# Patient Record
Sex: MALE | Race: WHITE | Employment: OTHER | URBAN - METROPOLITAN AREA
[De-identification: names, ages, dates, MRNs, and addresses within clinical notes are randomized per-mention and may not be internally consistent; named-entity substitution may affect disease eponyms.]

---

## 2017-05-30 ENCOUNTER — HOSPITAL ENCOUNTER (INPATIENT)
Facility: HOSPITAL | Age: 82
LOS: 1 days | Discharge: HOME | DRG: 690 | End: 2017-05-31
Attending: EMERGENCY MEDICINE | Admitting: HOSPITALIST
Payer: MEDICARE

## 2017-05-30 ENCOUNTER — APPOINTMENT (OUTPATIENT)
Dept: CT IMAGING | Age: 82
DRG: 690 | End: 2017-05-30
Attending: EMERGENCY MEDICINE
Payer: MEDICARE

## 2017-05-30 ENCOUNTER — OFFICE VISIT (OUTPATIENT)
Dept: FAMILY MEDICINE CLINIC | Facility: CLINIC | Age: 82
End: 2017-05-30

## 2017-05-30 VITALS
OXYGEN SATURATION: 97 % | RESPIRATION RATE: 18 BRPM | DIASTOLIC BLOOD PRESSURE: 78 MMHG | TEMPERATURE: 99 F | SYSTOLIC BLOOD PRESSURE: 120 MMHG | HEART RATE: 82 BPM | BODY MASS INDEX: 29 KG/M2 | WEIGHT: 188 LBS

## 2017-05-30 DIAGNOSIS — R30.0 DYSURIA: Primary | ICD-10-CM

## 2017-05-30 DIAGNOSIS — Z02.9 ENCOUNTERS FOR ADMINISTRATIVE PURPOSES: ICD-10-CM

## 2017-05-30 DIAGNOSIS — N12 PYELONEPHRITIS: Primary | ICD-10-CM

## 2017-05-30 PROBLEM — D69.6 THROMBOCYTOPENIA (HCC): Status: ACTIVE | Noted: 2017-05-30

## 2017-05-30 PROBLEM — R73.9 HYPERGLYCEMIA: Status: ACTIVE | Noted: 2017-05-30

## 2017-05-30 PROBLEM — D69.6 THROMBOCYTOPENIA: Status: ACTIVE | Noted: 2017-05-30

## 2017-05-30 PROCEDURE — 81003 URINALYSIS AUTO W/O SCOPE: CPT | Performed by: PHYSICIAN ASSISTANT

## 2017-05-30 PROCEDURE — 99223 1ST HOSP IP/OBS HIGH 75: CPT | Performed by: HOSPITALIST

## 2017-05-30 PROCEDURE — 74176 CT ABD & PELVIS W/O CONTRAST: CPT | Performed by: EMERGENCY MEDICINE

## 2017-05-30 RX ORDER — ATORVASTATIN CALCIUM 10 MG/1
10 TABLET, FILM COATED ORAL NIGHTLY
Status: DISCONTINUED | OUTPATIENT
Start: 2017-05-30 | End: 2017-05-31

## 2017-05-30 RX ORDER — POTASSIUM CHLORIDE 20 MEQ/1
40 TABLET, EXTENDED RELEASE ORAL EVERY 4 HOURS
Status: COMPLETED | OUTPATIENT
Start: 2017-05-30 | End: 2017-05-30

## 2017-05-30 RX ORDER — DORZOLAMIDE HCL 20 MG/ML
1 SOLUTION/ DROPS OPHTHALMIC 2 TIMES DAILY
COMMUNITY
End: 2021-01-23

## 2017-05-30 RX ORDER — ACETAMINOPHEN 325 MG/1
650 TABLET ORAL EVERY 4 HOURS PRN
Status: DISCONTINUED | OUTPATIENT
Start: 2017-05-30 | End: 2017-05-31

## 2017-05-30 RX ORDER — CLOPIDOGREL BISULFATE 75 MG/1
75 TABLET ORAL DAILY
Status: DISCONTINUED | OUTPATIENT
Start: 2017-05-30 | End: 2017-05-31

## 2017-05-30 RX ORDER — SODIUM CHLORIDE 9 MG/ML
INJECTION, SOLUTION INTRAVENOUS CONTINUOUS
Status: DISCONTINUED | OUTPATIENT
Start: 2017-05-30 | End: 2017-05-31

## 2017-05-30 RX ORDER — PANTOPRAZOLE SODIUM 40 MG/1
40 TABLET, DELAYED RELEASE ORAL
Status: DISCONTINUED | OUTPATIENT
Start: 2017-05-31 | End: 2017-05-31

## 2017-05-30 RX ORDER — LEVOTHYROXINE SODIUM 0.03 MG/1
25 TABLET ORAL
Status: DISCONTINUED | OUTPATIENT
Start: 2017-05-30 | End: 2017-05-31

## 2017-05-30 RX ORDER — MORPHINE SULFATE 4 MG/ML
4 INJECTION, SOLUTION INTRAMUSCULAR; INTRAVENOUS EVERY 2 HOUR PRN
Status: DISCONTINUED | OUTPATIENT
Start: 2017-05-30 | End: 2017-05-31

## 2017-05-30 RX ORDER — CETIRIZINE HYDROCHLORIDE 10 MG/1
10 TABLET ORAL DAILY
Status: DISCONTINUED | OUTPATIENT
Start: 2017-05-30 | End: 2017-05-31

## 2017-05-30 RX ORDER — MORPHINE SULFATE 2 MG/ML
1 INJECTION, SOLUTION INTRAMUSCULAR; INTRAVENOUS EVERY 2 HOUR PRN
Status: DISCONTINUED | OUTPATIENT
Start: 2017-05-30 | End: 2017-05-31

## 2017-05-30 RX ORDER — MORPHINE SULFATE 2 MG/ML
2 INJECTION, SOLUTION INTRAMUSCULAR; INTRAVENOUS EVERY 2 HOUR PRN
Status: DISCONTINUED | OUTPATIENT
Start: 2017-05-30 | End: 2017-05-31

## 2017-05-30 RX ORDER — LATANOPROST 50 UG/ML
1 SOLUTION/ DROPS OPHTHALMIC NIGHTLY
Status: DISCONTINUED | OUTPATIENT
Start: 2017-05-30 | End: 2017-05-31

## 2017-05-30 RX ORDER — LORAZEPAM 0.5 MG/1
0.5 TABLET ORAL EVERY 4 HOURS PRN
Status: DISCONTINUED | OUTPATIENT
Start: 2017-05-30 | End: 2017-05-31

## 2017-05-30 RX ORDER — FINASTERIDE 5 MG/1
5 TABLET, FILM COATED ORAL DAILY
Status: DISCONTINUED | OUTPATIENT
Start: 2017-05-30 | End: 2017-05-31

## 2017-05-30 RX ORDER — ALFUZOSIN HYDROCHLORIDE 10 MG/1
10 TABLET, EXTENDED RELEASE ORAL
Status: DISCONTINUED | OUTPATIENT
Start: 2017-05-31 | End: 2017-05-31

## 2017-05-30 RX ORDER — ZOLPIDEM TARTRATE 5 MG/1
5 TABLET ORAL NIGHTLY PRN
Status: DISCONTINUED | OUTPATIENT
Start: 2017-05-30 | End: 2017-05-31

## 2017-05-30 RX ORDER — TIMOLOL MALEATE 5 MG/ML
1 SOLUTION/ DROPS OPHTHALMIC DAILY
Status: DISCONTINUED | OUTPATIENT
Start: 2017-05-30 | End: 2017-05-31

## 2017-05-30 RX ORDER — ONDANSETRON 2 MG/ML
4 INJECTION INTRAMUSCULAR; INTRAVENOUS EVERY 6 HOURS PRN
Status: DISCONTINUED | OUTPATIENT
Start: 2017-05-30 | End: 2017-05-31

## 2017-05-30 RX ORDER — METOPROLOL SUCCINATE 25 MG/1
25 TABLET, EXTENDED RELEASE ORAL
Status: DISCONTINUED | OUTPATIENT
Start: 2017-05-31 | End: 2017-05-31

## 2017-05-30 RX ORDER — RABEPRAZOLE SODIUM 20 MG/1
20 TABLET, DELAYED RELEASE ORAL DAILY
COMMUNITY
End: 2021-10-05

## 2017-05-30 RX ORDER — ENOXAPARIN SODIUM 100 MG/ML
40 INJECTION SUBCUTANEOUS EVERY 24 HOURS
Status: DISCONTINUED | OUTPATIENT
Start: 2017-05-30 | End: 2017-05-31

## 2017-05-30 RX ORDER — ESCITALOPRAM OXALATE 20 MG/1
20 TABLET ORAL DAILY
Status: DISCONTINUED | OUTPATIENT
Start: 2017-05-30 | End: 2017-05-31

## 2017-05-30 RX ORDER — EZETIMIBE 10 MG/1
10 TABLET ORAL NIGHTLY
Status: DISCONTINUED | OUTPATIENT
Start: 2017-05-30 | End: 2017-05-31

## 2017-05-30 RX ORDER — HYDROCODONE BITARTRATE AND ACETAMINOPHEN 5; 325 MG/1; MG/1
2 TABLET ORAL EVERY 4 HOURS PRN
Status: DISCONTINUED | OUTPATIENT
Start: 2017-05-30 | End: 2017-05-31

## 2017-05-30 RX ORDER — HYDROCODONE BITARTRATE AND ACETAMINOPHEN 5; 325 MG/1; MG/1
1 TABLET ORAL EVERY 4 HOURS PRN
Status: DISCONTINUED | OUTPATIENT
Start: 2017-05-30 | End: 2017-05-31

## 2017-05-30 RX ORDER — ASPIRIN 81 MG/1
81 TABLET, CHEWABLE ORAL DAILY
Status: DISCONTINUED | OUTPATIENT
Start: 2017-05-30 | End: 2017-05-31

## 2017-05-30 RX ORDER — AMLODIPINE BESYLATE 5 MG/1
5 TABLET ORAL DAILY
Status: DISCONTINUED | OUTPATIENT
Start: 2017-05-30 | End: 2017-05-31

## 2017-05-30 RX ORDER — SODIUM CHLORIDE 9 MG/ML
INJECTION, SOLUTION INTRAVENOUS CONTINUOUS
Status: ACTIVE | OUTPATIENT
Start: 2017-05-30 | End: 2017-05-30

## 2017-05-30 RX ORDER — ONDANSETRON 2 MG/ML
4 INJECTION INTRAMUSCULAR; INTRAVENOUS EVERY 4 HOURS PRN
Status: DISCONTINUED | OUTPATIENT
Start: 2017-05-30 | End: 2017-05-30

## 2017-05-30 NOTE — PROGRESS NOTES
CHIEF COMPLAINT:   Patient presents with: Body ache and/or chills: Loss of appetite, Since Sunday. HPI:   Fidelia Salas is a [de-identified]year old male who presents for \"not feeling well since Sunday. \"  Pt reports he is unable to sleep, subjective fevers

## 2017-05-30 NOTE — H&P
[facility] HOSPITALIST  History and Physical     [facility] Patient Status:  Emergency    1935 MRN [id number]   Location [facility] Attending Vicki Wells MD   Hosp Day # 0 PCP Luli Nick     Chief Complaint: CHOLECYSTECTOMY N/A 3/25/2015    Comment Procedure: LAPAROSCOPIC CHOLECYSTECTOMY;  Surgeon: Milly Carvalho DO;  Location: [facility] MAIN OR       Social History:  reports that he quit smoking about 46 years ago. His smoking use included Cigarettes.  He smoked 0.5 drop into both eyes nightly. Disp:  Rfl:    Ergocalciferol (VITAMIN D OR) Take 5,000 mg by mouth daily. Disp:  Rfl:    Dexlansoprazole (DEXILANT) 60 MG Oral Capsule Delayed Release Take 60 mg by mouth daily.  Disp: 90 capsule Rfl: 0   aspirin 81 MG Oral T 36.8 mL/min (based on Cr of 1.55). No results for input(s): PTP, INR in the last 72 hours. No results for input(s): TROP, CK in the last 72 hours. Imaging: Imaging data reviewed in Epic. ASSESSMENT / PLAN:     1. Acute pyelonephritis  1.  IVF,

## 2017-05-30 NOTE — ED PROVIDER NOTES
Patient Seen in: THE [facility] Emergency Department In [place]    History   Patient presents with:  Fever (infectious)  Urinary Symptoms (urologic)    Stated Complaint: SINCE SUNDAY WITH CHILLS, FEVER, INABLILITY TO EAT.   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   STATES WAS ISABEL WBC Urine 21-50 (*)     Bacteria Urine 1+ (*)     Granular Casts Present (*)     Mucous Urine 1+ (*)     All other components within normal limits   CBC W/ DIFFERENTIAL - Abnormal; Notable for the following:     .0 (*)     MCH 33.7 (*)     RDW-SD 49 BASES:  Stable. Mild scarring and/or atelectasis. OTHER:  None. patient was given IV fluids and Rocephin  MDM     [de-identified]year-old with urinary tract infection symptoms, weakness and fever at home. CT without obstructing ureteral stone.   Creatinine minimal

## 2017-05-31 VITALS
OXYGEN SATURATION: 100 % | WEIGHT: 185 LBS | RESPIRATION RATE: 18 BRPM | DIASTOLIC BLOOD PRESSURE: 70 MMHG | TEMPERATURE: 98 F | SYSTOLIC BLOOD PRESSURE: 142 MMHG | HEART RATE: 64 BPM | HEIGHT: 68 IN | BODY MASS INDEX: 28.04 KG/M2

## 2017-05-31 PROCEDURE — 99239 HOSP IP/OBS DSCHRG MGMT >30: CPT | Performed by: HOSPITALIST

## 2017-05-31 RX ORDER — CEFADROXIL 500 MG/1
500 CAPSULE ORAL 2 TIMES DAILY
Qty: 14 CAPSULE | Refills: 0 | Status: SHIPPED | OUTPATIENT
Start: 2017-05-31 | End: 2017-06-07

## 2017-05-31 NOTE — PLAN OF CARE
DISCHARGE PLANNING    • Discharge to home or other facility with appropriate resources Adequate for Discharge        GENITOURINARY - ADULT    • Absence of urinary retention Adequate for Discharge        PAIN - ADULT    • Verbalizes/displays adequate comfor

## 2017-05-31 NOTE — PROGRESS NOTES
Patient is requesting a sleeping pill. Patient reports he normally takes 5-10mg of ambien at home. Dr. Ivan Aguayo paged-waiting for response. 2045: New orders received. Will implement.

## 2017-05-31 NOTE — CM/SW NOTE
05/31/17 1100   CM/SW Referral Data   Referral Source Physician;Social Work (self-referral)   Reason for Referral Discharge planning   Informant Patient   Pertinent Medical Hx   Primary Care Physician Name Seymoure   Patient Info   Patient's Mental Stat

## 2017-06-02 NOTE — DISCHARGE SUMMARY
[facility] HOSPITALIST  DISCHARGE SUMMARY     Germain Ward Patient Status:  Inpatient    1935 MRN [id number]   [facility] 3NW-A Attending No att. providers found   Hosp Day # 1 PCP Nava Dooley     Date of Admission: 2017  Date of renal function returned to his baseline. He was eventually stable for discharge on oral antibiotics. His blood cultures remained negative.         Discharge Medication List:     Discharge Medications      START taking these medications       Instructions 25 mcg on 5/31/2017  6:19 AM   Commonly known as:  SYNTHROID, LEVOTHROID        Take 25 mcg by mouth before breakfast.    Refills:  0       LORazepam 0.5 MG Tabs   Commonly known as:  ATIVAN        Take 0.5 mg by mouth nightly.     Refills:  0       Metopro Karo Espinoza MD 6/2/2017    Time spent:  > 30 minutes

## 2017-09-04 ENCOUNTER — HOSPITAL ENCOUNTER (EMERGENCY)
Age: 82
Discharge: HOME | End: 2017-09-04
Attending: EMERGENCY MEDICINE
Payer: MEDICARE

## 2017-09-04 VITALS
BODY MASS INDEX: 29.82 KG/M2 | TEMPERATURE: 98 F | HEIGHT: 67 IN | RESPIRATION RATE: 18 BRPM | WEIGHT: 190 LBS | DIASTOLIC BLOOD PRESSURE: 81 MMHG | HEART RATE: 68 BPM | OXYGEN SATURATION: 100 % | SYSTOLIC BLOOD PRESSURE: 133 MMHG

## 2017-09-04 DIAGNOSIS — B02.8 HERPES ZOSTER WITH COMPLICATION: Primary | ICD-10-CM

## 2017-09-04 PROCEDURE — 99283 EMERGENCY DEPT VISIT LOW MDM: CPT

## 2017-09-04 RX ORDER — ACYCLOVIR 800 MG/1
800 TABLET ORAL 3 TIMES DAILY
Qty: 21 TABLET | Refills: 0 | Status: SHIPPED | OUTPATIENT
Start: 2017-09-04 | End: 2021-01-23

## 2017-09-04 RX ORDER — PREDNISONE 20 MG/1
40 TABLET ORAL DAILY
Qty: 10 TABLET | Refills: 0 | Status: SHIPPED | OUTPATIENT
Start: 2017-09-04 | End: 2017-09-09

## 2017-09-04 NOTE — ED PROVIDER NOTES
Patient Seen in: [facility] In [place]    History   Patient presents with:  Rash Skin Problem (integumentary)    Stated Complaint: rash to perineal area    HPI    CHIEF COMPLAINT: Rash to scrotum and left buttock    HISTORY OF PRESENT • History of kidney cancer    • Hypothyroid    • Other and unspecified hyperlipidemia    • Unspecified disorder of thyroid    • Unspecified essential hypertension    • Unspecified sleep apnea     no CPAP   • Vitamin B 12 deficiency        Past Surgical His Levothyroxine Sodium (SYNTHROID, LEVOTHROID) 25 MCG Oral Tab,  Take 25 mcg by mouth before breakfast.   TRAVATAN Z 0.004 % Ophthalmic Solution,  Place 1 drop into both eyes nightly. Ergocalciferol (VITAMIN D OR),  Take 5,000 mg by mouth daily.      Alex Zhou Cardiovascular: Normal rate, regular rhythm, normal heart sounds and intact distal pulses. Pulmonary/Chest: Effort normal and breath sounds normal.   Abdominal: Soft.  Bowel sounds are normal. Hernia confirmed negative in the right inguinal area and conf Medications Prescribed:  Discharge Medication List as of 9/4/2017  1:47 PM    START taking these medications    acyclovir 800 MG Oral Tab  Take 1 tablet (800 mg total) by mouth 3 (three) times daily. , Normal, Disp-21 tablet, R-0    predniSONE 20 MG Oral

## 2017-09-04 NOTE — ED PROVIDER NOTES
I reviewed that chart and discussed the case. I have examined the patient and noted patient has what seems to be a shingles going from his left buttocks, left scrotal area, left perineal area to the left side of his penis area. There is no discharge.   He

## 2018-06-08 ENCOUNTER — APPOINTMENT (OUTPATIENT)
Dept: GENERAL RADIOLOGY | Age: 83
End: 2018-06-08
Attending: EMERGENCY MEDICINE
Payer: MEDICARE

## 2018-06-08 ENCOUNTER — HOSPITAL ENCOUNTER (EMERGENCY)
Age: 83
Discharge: HOME | End: 2018-06-08
Attending: EMERGENCY MEDICINE
Payer: MEDICARE

## 2018-06-08 VITALS
DIASTOLIC BLOOD PRESSURE: 74 MMHG | HEART RATE: 57 BPM | SYSTOLIC BLOOD PRESSURE: 115 MMHG | HEIGHT: 68 IN | WEIGHT: 180 LBS | BODY MASS INDEX: 27.28 KG/M2 | TEMPERATURE: 98 F | RESPIRATION RATE: 16 BRPM | OXYGEN SATURATION: 98 %

## 2018-06-08 DIAGNOSIS — R07.89 CHEST PAIN, ATYPICAL: Primary | ICD-10-CM

## 2018-06-08 PROCEDURE — 36415 COLL VENOUS BLD VENIPUNCTURE: CPT

## 2018-06-08 PROCEDURE — 99285 EMERGENCY DEPT VISIT HI MDM: CPT

## 2018-06-08 PROCEDURE — 84484 ASSAY OF TROPONIN QUANT: CPT | Performed by: EMERGENCY MEDICINE

## 2018-06-08 PROCEDURE — 85025 COMPLETE CBC W/AUTO DIFF WBC: CPT | Performed by: EMERGENCY MEDICINE

## 2018-06-08 PROCEDURE — 80053 COMPREHEN METABOLIC PANEL: CPT | Performed by: EMERGENCY MEDICINE

## 2018-06-08 PROCEDURE — 71045 X-RAY EXAM CHEST 1 VIEW: CPT | Performed by: EMERGENCY MEDICINE

## 2018-06-08 PROCEDURE — 93010 ELECTROCARDIOGRAM REPORT: CPT

## 2018-06-08 PROCEDURE — 93005 ELECTROCARDIOGRAM TRACING: CPT

## 2018-06-09 NOTE — ED INITIAL ASSESSMENT (HPI)
PT STS CHEST PAIN X 1 WEEK. PT STS EPISODES LASTING \"A FEW MINUTES\" EVERY EVENING SINCE ONSET. PT DENIES CP AT PRESENT.

## 2018-06-09 NOTE — ED PROVIDER NOTES
Patient Seen in: [facility] In [place]    History   Patient presents with:  Chest Pain Angina (cardiovascular)    Stated Complaint: CHEST PAIN     HPI    Patient is an 42-year-old male with a history of hypertension, high cholesterol, VASECTOMY        Smoking status: Former Smoker                                                              Packs/day: 0.50      Years: 0.00         Types: Cigarettes     Quit date: 12/20/1970  Smokeless tobacco: Never Used                      Comment: qu I - Normal   CBC WITH DIFFERENTIAL WITH PLATELET    Narrative: The following orders were created for panel order CBC WITH DIFFERENTIAL WITH PLATELET.   Procedure                               Abnormality         Status                     --------- troponin at the 2 hour irene and discuss again. Updated 11:10 PM.  Repeat troponin negative. Patient remains pain-free, no episodes here. He is very eager to go home. I did again offer admission for serial troponins but he declines.   I discussed with

## 2018-11-05 ENCOUNTER — APPOINTMENT (OUTPATIENT)
Dept: GENERAL RADIOLOGY | Age: 83
End: 2018-11-05
Attending: EMERGENCY MEDICINE
Payer: MEDICARE

## 2018-11-05 ENCOUNTER — HOSPITAL ENCOUNTER (EMERGENCY)
Age: 83
Discharge: HOME | End: 2018-11-05
Attending: EMERGENCY MEDICINE
Payer: MEDICARE

## 2018-11-05 ENCOUNTER — OFFICE VISIT (OUTPATIENT)
Dept: FAMILY MEDICINE CLINIC | Facility: CLINIC | Age: 83
End: 2018-11-05
Payer: MEDICARE

## 2018-11-05 VITALS
SYSTOLIC BLOOD PRESSURE: 98 MMHG | HEIGHT: 68 IN | WEIGHT: 172 LBS | BODY MASS INDEX: 26.07 KG/M2 | OXYGEN SATURATION: 98 % | RESPIRATION RATE: 20 BRPM | TEMPERATURE: 98 F | DIASTOLIC BLOOD PRESSURE: 54 MMHG | HEART RATE: 80 BPM

## 2018-11-05 VITALS
RESPIRATION RATE: 16 BRPM | OXYGEN SATURATION: 98 % | BODY MASS INDEX: 26.07 KG/M2 | SYSTOLIC BLOOD PRESSURE: 147 MMHG | HEIGHT: 68 IN | HEART RATE: 67 BPM | WEIGHT: 172 LBS | DIASTOLIC BLOOD PRESSURE: 79 MMHG | TEMPERATURE: 98 F

## 2018-11-05 DIAGNOSIS — N39.0 URINARY TRACT INFECTION WITHOUT HEMATURIA, SITE UNSPECIFIED: Primary | ICD-10-CM

## 2018-11-05 DIAGNOSIS — Z02.9 ENCOUNTER FOR ADMINISTRATIVE EXAMINATIONS: Primary | ICD-10-CM

## 2018-11-05 PROCEDURE — 80053 COMPREHEN METABOLIC PANEL: CPT | Performed by: EMERGENCY MEDICINE

## 2018-11-05 PROCEDURE — 96365 THER/PROPH/DIAG IV INF INIT: CPT

## 2018-11-05 PROCEDURE — 81001 URINALYSIS AUTO W/SCOPE: CPT | Performed by: EMERGENCY MEDICINE

## 2018-11-05 PROCEDURE — 87088 URINE BACTERIA CULTURE: CPT | Performed by: EMERGENCY MEDICINE

## 2018-11-05 PROCEDURE — 74018 RADEX ABDOMEN 1 VIEW: CPT | Performed by: EMERGENCY MEDICINE

## 2018-11-05 PROCEDURE — 87086 URINE CULTURE/COLONY COUNT: CPT | Performed by: EMERGENCY MEDICINE

## 2018-11-05 PROCEDURE — 85025 COMPLETE CBC W/AUTO DIFF WBC: CPT

## 2018-11-05 PROCEDURE — 93005 ELECTROCARDIOGRAM TRACING: CPT

## 2018-11-05 PROCEDURE — 96361 HYDRATE IV INFUSION ADD-ON: CPT

## 2018-11-05 PROCEDURE — 85025 COMPLETE CBC W/AUTO DIFF WBC: CPT | Performed by: EMERGENCY MEDICINE

## 2018-11-05 PROCEDURE — 83605 ASSAY OF LACTIC ACID: CPT | Performed by: EMERGENCY MEDICINE

## 2018-11-05 PROCEDURE — 81015 MICROSCOPIC EXAM OF URINE: CPT | Performed by: EMERGENCY MEDICINE

## 2018-11-05 PROCEDURE — 87186 SC STD MICRODIL/AGAR DIL: CPT | Performed by: EMERGENCY MEDICINE

## 2018-11-05 PROCEDURE — 93010 ELECTROCARDIOGRAM REPORT: CPT

## 2018-11-05 PROCEDURE — 99285 EMERGENCY DEPT VISIT HI MDM: CPT

## 2018-11-05 PROCEDURE — 80053 COMPREHEN METABOLIC PANEL: CPT

## 2018-11-05 RX ORDER — SODIUM CHLORIDE 9 MG/ML
INJECTION, SOLUTION INTRAVENOUS ONCE
Status: COMPLETED | OUTPATIENT
Start: 2018-11-05 | End: 2018-11-05

## 2018-11-05 RX ORDER — SULFAMETHOXAZOLE AND TRIMETHOPRIM 800; 160 MG/1; MG/1
1 TABLET ORAL 2 TIMES DAILY
Qty: 20 TABLET | Refills: 0 | Status: SHIPPED | OUTPATIENT
Start: 2018-11-05 | End: 2018-11-15

## 2018-11-05 NOTE — ED PROVIDER NOTES
Patient Seen in: THE [facility] Emergency Department In [place]    History   Patient presents with:  Hypotension (cardiovascular)    Stated Complaint: low blood pressure    HPI    42-year-old male with history of coronary artery disease and one kidney status Pack years: 0        Types: Cigarettes        Quit date: 1970        Years since quittin.9      Smokeless tobacco: Never Used      Tobacco comment: quit 50 yrs ago    Alcohol use: No    Drug use: No      Review of Systems    Positive for stated (*)     Bacteria Urine 1+ (*)     All other components within normal limits   CBC W/ DIFFERENTIAL - Abnormal; Notable for the following components:    RDW-SD 48.2 (*)     Neutrophil Absolute Prelim 7.29 (*)     Neutrophil Absolute 7.29 (*)     All other co pm    Follow-up:  Garth Bar  [address] [phone]    Call in 2 days          Medications Prescribed:  Current Discharge Medication List    START taking these medications    Sulfamethoxazole-TMP -160 MG Oral Tab p

## 2018-11-05 NOTE — PROGRESS NOTES
Pt presented with   Weakness weakness, unable to eat, X 1-3 days      Constipation X 1-3 days     Urinary Frequency X 1-3 days    History 1 kidney. Only drank a cup of water today. Very poor appetite.  Having urinary frequency, doesn't usually have urinary

## 2018-11-19 ENCOUNTER — LAB ENCOUNTER (OUTPATIENT)
Dept: LAB | Age: 83
End: 2018-11-19
Attending: FAMILY MEDICINE
Payer: MEDICARE

## 2018-11-19 DIAGNOSIS — N39.0 URINARY TRACT INFECTION, SITE NOT SPECIFIED: Primary | ICD-10-CM

## 2018-11-19 PROCEDURE — 87086 URINE CULTURE/COLONY COUNT: CPT

## 2021-01-23 ENCOUNTER — APPOINTMENT (OUTPATIENT)
Dept: GENERAL RADIOLOGY | Age: 86
End: 2021-01-23
Attending: PHYSICIAN ASSISTANT
Payer: MEDICARE

## 2021-01-23 ENCOUNTER — HOSPITAL ENCOUNTER (OUTPATIENT)
Facility: HOSPITAL | Age: 86
Setting detail: OBSERVATION
Discharge: HOME | End: 2021-01-24
Attending: EMERGENCY MEDICINE | Admitting: HOSPITALIST
Payer: MEDICARE

## 2021-01-23 DIAGNOSIS — R07.89 CHEST PAIN, ATYPICAL: Primary | ICD-10-CM

## 2021-01-23 DIAGNOSIS — R05.9 COUGH: ICD-10-CM

## 2021-01-23 LAB
ALBUMIN SERPL-MCNC: 3.3 G/DL (ref 3.4–5)
ALBUMIN/GLOB SERPL: 1.1 {RATIO} (ref 1–2)
ALP LIVER SERPL-CCNC: 43 U/L
ALT SERPL-CCNC: 18 U/L
ANION GAP SERPL CALC-SCNC: 5 MMOL/L (ref 0–18)
APTT PPP: 29.4 SECONDS (ref 25.4–36.1)
AST SERPL-CCNC: 15 U/L (ref 15–37)
ATRIAL RATE: 53 BPM
BASOPHILS # BLD AUTO: 0.03 X10(3) UL (ref 0–0.2)
BASOPHILS NFR BLD AUTO: 0.4 %
BILIRUB SERPL-MCNC: 0.7 MG/DL (ref 0.1–2)
BUN BLD-MCNC: 15 MG/DL (ref 7–18)
BUN/CREAT SERPL: 10.9 (ref 10–20)
CALCIUM BLD-MCNC: 8.6 MG/DL (ref 8.5–10.1)
CHLORIDE SERPL-SCNC: 107 MMOL/L (ref 98–112)
CO2 SERPL-SCNC: 28 MMOL/L (ref 21–32)
CREAT BLD-MCNC: 1.37 MG/DL
D-DIMER: 0.65 UG/ML FEU (ref ?–0.83)
DEPRECATED RDW RBC AUTO: 47.8 FL (ref 35.1–46.3)
EOSINOPHIL # BLD AUTO: 0.13 X10(3) UL (ref 0–0.7)
EOSINOPHIL NFR BLD AUTO: 1.9 %
ERYTHROCYTE [DISTWIDTH] IN BLOOD BY AUTOMATED COUNT: 13 % (ref 11–15)
GLOBULIN PLAS-MCNC: 3 G/DL (ref 2.8–4.4)
GLUCOSE BLD-MCNC: 91 MG/DL (ref 70–99)
HCT VFR BLD AUTO: 38.5 %
HGB BLD-MCNC: 12.7 G/DL
IMM GRANULOCYTES # BLD AUTO: 0.01 X10(3) UL (ref 0–1)
IMM GRANULOCYTES NFR BLD: 0.1 %
INR BLD: 0.92 (ref 0.88–1.11)
LYMPHOCYTES # BLD AUTO: 1.34 X10(3) UL (ref 1–4)
LYMPHOCYTES NFR BLD AUTO: 20.1 %
M PROTEIN MFR SERPL ELPH: 6.3 G/DL (ref 6.4–8.2)
MCH RBC QN AUTO: 32.9 PG (ref 26–34)
MCHC RBC AUTO-ENTMCNC: 33 G/DL (ref 31–37)
MCV RBC AUTO: 99.7 FL
MONOCYTES # BLD AUTO: 0.73 X10(3) UL (ref 0.1–1)
MONOCYTES NFR BLD AUTO: 10.9 %
NEUTROPHILS # BLD AUTO: 4.43 X10 (3) UL (ref 1.5–7.7)
NEUTROPHILS # BLD AUTO: 4.43 X10(3) UL (ref 1.5–7.7)
NEUTROPHILS NFR BLD AUTO: 66.6 %
OSMOLALITY SERPL CALC.SUM OF ELEC: 290 MOSM/KG (ref 275–295)
P AXIS: 16 DEGREES
P-R INTERVAL: 144 MS
PLATELET # BLD AUTO: 154 10(3)UL (ref 150–450)
POTASSIUM SERPL-SCNC: 4 MMOL/L (ref 3.5–5.1)
PSA SERPL DL<=0.01 NG/ML-MCNC: 12.2 SECONDS (ref 12–14.3)
Q-T INTERVAL: 454 MS
QRS DURATION: 94 MS
QTC CALCULATION (BEZET): 426 MS
R AXIS: -43 DEGREES
RBC # BLD AUTO: 3.86 X10(6)UL
SARS-COV-2 RNA RESP QL NAA+PROBE: NOT DETECTED
SODIUM SERPL-SCNC: 140 MMOL/L (ref 136–145)
T AXIS: 12 DEGREES
TROPONIN I SERPL-MCNC: <0.045 NG/ML (ref ?–0.04)
VENTRICULAR RATE: 53 BPM
WBC # BLD AUTO: 6.7 X10(3) UL (ref 4–11)

## 2021-01-23 PROCEDURE — 99220 INITIAL OBSERVATION CARE,LEVL III: CPT | Performed by: HOSPITALIST

## 2021-01-23 PROCEDURE — 71045 X-RAY EXAM CHEST 1 VIEW: CPT | Performed by: PHYSICIAN ASSISTANT

## 2021-01-23 RX ORDER — GABAPENTIN 100 MG/1
100 CAPSULE ORAL 2 TIMES DAILY
COMMUNITY

## 2021-01-23 RX ORDER — EZETIMIBE 10 MG/1
10 TABLET ORAL NIGHTLY
Status: DISCONTINUED | OUTPATIENT
Start: 2021-01-23 | End: 2021-01-24

## 2021-01-23 RX ORDER — FLUTICASONE PROPIONATE 50 MCG
2 SPRAY, SUSPENSION (ML) NASAL DAILY
COMMUNITY

## 2021-01-23 RX ORDER — ROSUVASTATIN CALCIUM 20 MG/1
20 TABLET, COATED ORAL NIGHTLY
Status: DISCONTINUED | OUTPATIENT
Start: 2021-01-23 | End: 2021-01-24

## 2021-01-23 RX ORDER — HYDROCODONE BITARTRATE AND ACETAMINOPHEN 5; 300 MG/1; MG/1
1 TABLET ORAL EVERY 6 HOURS PRN
COMMUNITY

## 2021-01-23 RX ORDER — FLUTICASONE PROPIONATE 50 MCG
2 SPRAY, SUSPENSION (ML) NASAL DAILY
Status: DISCONTINUED | OUTPATIENT
Start: 2021-01-23 | End: 2021-01-24

## 2021-01-23 RX ORDER — ONDANSETRON 2 MG/ML
4 INJECTION INTRAMUSCULAR; INTRAVENOUS EVERY 6 HOURS PRN
Status: DISCONTINUED | OUTPATIENT
Start: 2021-01-23 | End: 2021-01-24

## 2021-01-23 RX ORDER — LEVOTHYROXINE SODIUM 0.03 MG/1
25 TABLET ORAL
Status: DISCONTINUED | OUTPATIENT
Start: 2021-01-23 | End: 2021-01-24

## 2021-01-23 RX ORDER — GABAPENTIN 100 MG/1
100 CAPSULE ORAL 2 TIMES DAILY
Status: DISCONTINUED | OUTPATIENT
Start: 2021-01-23 | End: 2021-01-24

## 2021-01-23 RX ORDER — DOCUSATE SODIUM 100 MG/1
100 CAPSULE, LIQUID FILLED ORAL 2 TIMES DAILY
COMMUNITY

## 2021-01-23 RX ORDER — ABIRATERONE ACETATE 250 MG/1
1000 TABLET ORAL DAILY
COMMUNITY

## 2021-01-23 RX ORDER — SODIUM CHLORIDE 9 MG/ML
INJECTION, SOLUTION INTRAVENOUS CONTINUOUS
Status: DISCONTINUED | OUTPATIENT
Start: 2021-01-23 | End: 2021-01-23

## 2021-01-23 RX ORDER — BRINZOLAMIDE 10 MG/ML
1 SUSPENSION/ DROPS OPHTHALMIC 2 TIMES DAILY
COMMUNITY

## 2021-01-23 RX ORDER — POLYETHYLENE GLYCOL 3350 17 G/17G
17 POWDER, FOR SOLUTION ORAL DAILY PRN
COMMUNITY

## 2021-01-23 RX ORDER — ASPIRIN 81 MG/1
81 TABLET, CHEWABLE ORAL EVERY OTHER DAY
Status: DISCONTINUED | OUTPATIENT
Start: 2021-01-24 | End: 2021-01-24

## 2021-01-23 RX ORDER — ROSUVASTATIN CALCIUM 20 MG/1
20 TABLET, COATED ORAL NIGHTLY
COMMUNITY

## 2021-01-23 RX ORDER — DOCUSATE SODIUM 100 MG/1
100 CAPSULE, LIQUID FILLED ORAL 2 TIMES DAILY
Status: DISCONTINUED | OUTPATIENT
Start: 2021-01-23 | End: 2021-01-24

## 2021-01-23 RX ORDER — SIMETHICONE 80 MG
80 TABLET,CHEWABLE ORAL
Status: DISCONTINUED | OUTPATIENT
Start: 2021-01-23 | End: 2021-01-24

## 2021-01-23 RX ORDER — AMINO ACIDS/MV,IRON,MIN
1 TABLET ORAL DAILY
COMMUNITY

## 2021-01-23 RX ORDER — ESCITALOPRAM OXALATE 20 MG/1
20 TABLET ORAL DAILY
Status: DISCONTINUED | OUTPATIENT
Start: 2021-01-23 | End: 2021-01-24

## 2021-01-23 RX ORDER — DORZOLAMIDE HCL 20 MG/ML
1 SOLUTION/ DROPS OPHTHALMIC 3 TIMES DAILY
Status: DISCONTINUED | OUTPATIENT
Start: 2021-01-23 | End: 2021-01-24

## 2021-01-23 RX ORDER — CLOPIDOGREL BISULFATE 75 MG/1
75 TABLET ORAL DAILY
Status: DISCONTINUED | OUTPATIENT
Start: 2021-01-23 | End: 2021-01-24

## 2021-01-23 RX ORDER — ACETAMINOPHEN 325 MG/1
650 TABLET ORAL EVERY 6 HOURS PRN
Status: DISCONTINUED | OUTPATIENT
Start: 2021-01-23 | End: 2021-01-24

## 2021-01-23 RX ORDER — BENZONATATE 100 MG/1
100 CAPSULE ORAL EVERY 8 HOURS PRN
Status: DISCONTINUED | OUTPATIENT
Start: 2021-01-23 | End: 2021-01-24

## 2021-01-23 RX ORDER — POLYETHYLENE GLYCOL 3350 17 G/17G
17 POWDER, FOR SOLUTION ORAL DAILY PRN
Status: DISCONTINUED | OUTPATIENT
Start: 2021-01-23 | End: 2021-01-24

## 2021-01-23 RX ORDER — TRAZODONE HYDROCHLORIDE 50 MG/1
50 TABLET ORAL NIGHTLY
Status: DISCONTINUED | OUTPATIENT
Start: 2021-01-23 | End: 2021-01-24

## 2021-01-23 RX ORDER — NETARSUDIL AND LATANOPROST OPHTHALMIC SOLUTION, 0.02%/0.005% .2; .05 MG/ML; MG/ML
1 SOLUTION/ DROPS OPHTHALMIC; TOPICAL NIGHTLY
COMMUNITY

## 2021-01-23 RX ORDER — ASPIRIN 81 MG/1
81 TABLET, CHEWABLE ORAL EVERY OTHER DAY
Status: DISCONTINUED | OUTPATIENT
Start: 2021-01-23 | End: 2021-01-23

## 2021-01-23 RX ORDER — METOPROLOL SUCCINATE 25 MG/1
25 TABLET, EXTENDED RELEASE ORAL
Status: DISCONTINUED | OUTPATIENT
Start: 2021-01-24 | End: 2021-01-24

## 2021-01-23 RX ORDER — PREDNISONE 1 MG/1
5 TABLET ORAL DAILY
Status: DISCONTINUED | OUTPATIENT
Start: 2021-01-23 | End: 2021-01-24

## 2021-01-23 RX ORDER — PREDNISONE 1 MG/1
5 TABLET ORAL DAILY
COMMUNITY

## 2021-01-23 RX ORDER — SIMETHICONE 180 MG
1 CAPSULE ORAL DAILY PRN
COMMUNITY

## 2021-01-23 RX ORDER — TRAZODONE HYDROCHLORIDE 50 MG/1
50 TABLET ORAL NIGHTLY
COMMUNITY

## 2021-01-23 RX ORDER — MELATONIN
3 NIGHTLY PRN
Status: DISCONTINUED | OUTPATIENT
Start: 2021-01-23 | End: 2021-01-24

## 2021-01-23 RX ORDER — OXYBUTYNIN CHLORIDE 10 MG/1
10 TABLET, EXTENDED RELEASE ORAL WEEKLY
COMMUNITY

## 2021-01-23 RX ORDER — ABIRATERONE ACETATE 250 MG/1
1000 TABLET ORAL DAILY
Status: DISCONTINUED | OUTPATIENT
Start: 2021-01-23 | End: 2021-01-24

## 2021-01-23 RX ORDER — OXYBUTYNIN CHLORIDE 10 MG/1
10 TABLET, EXTENDED RELEASE ORAL WEEKLY
Status: DISCONTINUED | OUTPATIENT
Start: 2021-01-23 | End: 2021-01-24

## 2021-01-23 RX ORDER — PANTOPRAZOLE SODIUM 40 MG/1
40 TABLET, DELAYED RELEASE ORAL
Status: DISCONTINUED | OUTPATIENT
Start: 2021-01-24 | End: 2021-01-24

## 2021-01-23 NOTE — ED PROVIDER NOTES
Patient Seen in: [facility] Emergency Department In [place]      History   Patient presents with:  Chest Pain  Cough  Headache  Fever    Stated Complaint: \"I think I have that virus\" cough dry throat fever and headache started this am*    HPI/Subjective Use      Smoking status: Former Smoker        Packs/day: 0.50        Years: 0.00        Pack years: 0        Types: Cigarettes        Quit date: 1970        Years since quittin.1      Smokeless tobacco: Never Used      Tobacco comment: quit 48 y components:       Result Value    Creatinine 1.37 (*)     GFR, Non- 47 (*)     GFR, -American 55 (*)     Alkaline Phosphatase 43 (*)     Total Protein 6.3 (*)     Albumin 3.3 (*)     All other components within normal limits   CBC W/ dry throat and headache. FINDINGS:  Mildly prominent cardiac silhouette, unchanged. No lobar consolidation. No significant pleural fluid or pneumothorax. CONCLUSION:  No acute cardiopulmonary process.     Dictated by (CST): Bree Araujo,

## 2021-01-23 NOTE — ED NOTES
I reviewed that chart and discussed the case. I have examined the patient and noted patient is an 63-year-old male presents emergency room with a history of multiple complaints.   The patient has a history of some headache dry throat and some cough with so appreciated. EKG shows sinus bradycardia rate of 53 nonspecific ST change with some T wave flattening laterally which is more pronounced than previous EKG. EKG    Rate, intervals and axes as noted on EKG Report.   Rate: 53  Rhythm: Sinus Rhythm  Luis Cordoba

## 2021-01-23 NOTE — ED INITIAL ASSESSMENT (HPI)
PT STATES HE WOKE UP WITH A HEADACHE AND A DRY THROAT AND DRY HEAVES. REPORTS CHEST PAIN THAT IS INTERMITTENT THAT IS CHRONIC.   REPORTS SOB THAT STARTED THIS AM

## 2021-01-23 NOTE — PROGRESS NOTES
Pharmacy Note: Dietary Supplement Discontinuation Per Policy    NDY-58 has been discontinued on [facility] per policy. This supplement may be restarted upon discharge using the medication reconciliation process.     Thank you,   Sanjuana Lerma,

## 2021-01-24 VITALS
DIASTOLIC BLOOD PRESSURE: 75 MMHG | RESPIRATION RATE: 18 BRPM | HEART RATE: 58 BPM | SYSTOLIC BLOOD PRESSURE: 137 MMHG | OXYGEN SATURATION: 96 % | HEIGHT: 67 IN | BODY MASS INDEX: 27.45 KG/M2 | TEMPERATURE: 98 F | WEIGHT: 174.88 LBS

## 2021-01-24 LAB
SARS-COV-2 RNA RESP QL NAA+PROBE: NOT DETECTED
TROPONIN I SERPL-MCNC: <0.045 NG/ML (ref ?–0.04)

## 2021-01-24 PROCEDURE — 99217 OBSERVATION CARE DISCHARGE: CPT | Performed by: HOSPITALIST

## 2021-01-24 NOTE — PROGRESS NOTES
Patient seen and examined. Medically clear to discharge today. Symptoms resolved pt does not wish to pursue any further work up for symptoms. States that his symptoms are gone and now wishes to just go home.   Discussed waiting for COVID  prior to DC

## 2021-01-24 NOTE — PLAN OF CARE
Problem: Patient/Family Goals  Goal: Patient/Family Long Term Goal    Note: Long term goal:no chest pain  Intervention;monitor vital signs  -Trop  -discharge instructions  Goal: Patient/Family Short Term Goal  Note: Short term goal:Relieve of sore and dr for needed discharge resources and transportation as appropriate  - Identify discharge learning needs (meds, wound care, etc)  - Arrange for interpreters to assist at discharge as needed  - Consider post-discharge preferences of patient/family/discharge pa signs/symptoms of CO2 retention  Outcome: Progressing     Problem: Impaired Swallowing  Goal: Minimize aspiration risk  Description: Interventions:  - Patient should be alert and upright for all feedings (90 degrees preferred)  - Offer food and liquids at

## 2021-01-24 NOTE — H&P
[facility] HOSPITALIST  History and Physical     Julian Verdugo Patient Status:  Observation    1935 MRN [id number]   [facility] 5NW-A Attending Alberto uMrray MD   Hosp Day # 0 PCP Chikis Beltran     Chief Complaint: dysphagia    Histor HISTORY Right 1996    nephrectomy   • OTHER SURGICAL HISTORY      vasectomy   • SURGICAL STENT  2001, 2/2013    coronary   • TONSILLECTOMY     • VASECTOMY  1971       Social History:  reports that he quit smoking about 44 years ago.  His smoking use include Rfl:     •  PEG 3350 17 g Oral Powd Pack, Take 17 g by mouth daily as needed. , Disp: , Rfl:     •  Netarsudil-Latanoprost (ROCKLATAN) 0.02-0.005 % Ophthalmic Solution, Place 1 drop into both eyes nightly., Disp: , Rfl:     •  Simethicone 180 MG Oral Cap, T Clear to auscultation bilaterally. No wheezes. No rhonchi. Cardiovascular: S1, S2. Regular rate and rhythm. No murmurs, no rubs or gallops. Equal pulses. Chest and Back: No tenderness or deformity. Abdomen: Soft, nontender, nondistended.   Positive katarzyna

## 2021-01-24 NOTE — COVID NURSING ASSESSMENT
COVID-19 Daily Discharge Readiness-Nursing    O2 Sat at Rest:   >94%  % on room air  O2 Sat with Exertion:    % on    liters   Temperature max from last 24 hrs: Temp (24hrs), Av °F (36.7 °C), Min:97.7 °F (36.5 °C), Max:98.2 °F (36.8 °C)    Inflammatory

## 2021-01-25 NOTE — DISCHARGE SUMMARY
[facility] HOSPITALIST  DISCHARGE SUMMARY     Joel Marking Patient Status:  Observation    1935 MRN [id number]   [facility] 5NW-A Attending No att. providers found   Hosp Day # 0 SIGIFREDO Iqbal     Date of Admission: 2021  Date o Blocker. Quantity: 30 tablet  Refills: 3        CONTINUE taking these medications      Instructions Prescription details   Abiraterone Acetate 250 MG Tabs      Take 1,000 mg by mouth daily.    Refills: 0     aspirin 81 MG Tabs      Take 81 mg by mouth dangelo Rocklatan 0.02-0.005 % Soln  Generic drug: Netarsudil-Latanoprost      Place 1 drop into both eyes nightly. Refills: 0     Rosuvastatin Calcium 20 MG Tabs  Commonly known as: CRESTOR      Take 20 mg by mouth nightly.    Refills: 0     Simethicone 180 MG

## 2021-04-23 ENCOUNTER — HOSPITAL ENCOUNTER (EMERGENCY)
Age: 86
Discharge: HOME | End: 2021-04-23
Attending: EMERGENCY MEDICINE
Payer: MEDICARE

## 2021-04-23 ENCOUNTER — APPOINTMENT (OUTPATIENT)
Dept: CT IMAGING | Age: 86
End: 2021-04-23
Attending: PHYSICIAN ASSISTANT
Payer: MEDICARE

## 2021-04-23 VITALS
BODY MASS INDEX: 25.43 KG/M2 | TEMPERATURE: 98 F | HEART RATE: 67 BPM | HEIGHT: 67 IN | SYSTOLIC BLOOD PRESSURE: 168 MMHG | DIASTOLIC BLOOD PRESSURE: 84 MMHG | OXYGEN SATURATION: 99 % | RESPIRATION RATE: 18 BRPM | WEIGHT: 162 LBS

## 2021-04-23 DIAGNOSIS — E87.6 HYPOKALEMIA: ICD-10-CM

## 2021-04-23 DIAGNOSIS — R11.2 NAUSEA AND VOMITING IN ADULT: ICD-10-CM

## 2021-04-23 DIAGNOSIS — R63.0 ANOREXIA: ICD-10-CM

## 2021-04-23 DIAGNOSIS — R53.1 WEAKNESS GENERALIZED: Primary | ICD-10-CM

## 2021-04-23 DIAGNOSIS — R10.32 LLQ ABDOMINAL PAIN: ICD-10-CM

## 2021-04-23 PROCEDURE — 83690 ASSAY OF LIPASE: CPT | Performed by: PHYSICIAN ASSISTANT

## 2021-04-23 PROCEDURE — 80053 COMPREHEN METABOLIC PANEL: CPT | Performed by: PHYSICIAN ASSISTANT

## 2021-04-23 PROCEDURE — 96366 THER/PROPH/DIAG IV INF ADDON: CPT

## 2021-04-23 PROCEDURE — 93010 ELECTROCARDIOGRAM REPORT: CPT

## 2021-04-23 PROCEDURE — 74177 CT ABD & PELVIS W/CONTRAST: CPT | Performed by: PHYSICIAN ASSISTANT

## 2021-04-23 PROCEDURE — 99285 EMERGENCY DEPT VISIT HI MDM: CPT

## 2021-04-23 PROCEDURE — 85025 COMPLETE CBC W/AUTO DIFF WBC: CPT | Performed by: PHYSICIAN ASSISTANT

## 2021-04-23 PROCEDURE — 96375 TX/PRO/DX INJ NEW DRUG ADDON: CPT

## 2021-04-23 PROCEDURE — 96361 HYDRATE IV INFUSION ADD-ON: CPT

## 2021-04-23 PROCEDURE — 93005 ELECTROCARDIOGRAM TRACING: CPT

## 2021-04-23 PROCEDURE — 81001 URINALYSIS AUTO W/SCOPE: CPT | Performed by: PHYSICIAN ASSISTANT

## 2021-04-23 PROCEDURE — 96365 THER/PROPH/DIAG IV INF INIT: CPT

## 2021-04-23 PROCEDURE — 99284 EMERGENCY DEPT VISIT MOD MDM: CPT

## 2021-04-23 RX ORDER — ONDANSETRON 2 MG/ML
4 INJECTION INTRAMUSCULAR; INTRAVENOUS ONCE
Status: COMPLETED | OUTPATIENT
Start: 2021-04-23 | End: 2021-04-23

## 2021-04-23 RX ORDER — SODIUM CHLORIDE 9 MG/ML
INJECTION, SOLUTION INTRAVENOUS CONTINUOUS
Status: DISCONTINUED | OUTPATIENT
Start: 2021-04-23 | End: 2021-04-23

## 2021-04-23 RX ORDER — POTASSIUM CHLORIDE 14.9 MG/ML
20 INJECTION INTRAVENOUS ONCE
Status: COMPLETED | OUTPATIENT
Start: 2021-04-23 | End: 2021-04-23

## 2021-04-23 RX ORDER — POTASSIUM CHLORIDE 14.9 MG/ML
20 INJECTION INTRAVENOUS ONCE
Status: DISCONTINUED | OUTPATIENT
Start: 2021-04-23 | End: 2021-04-23

## 2021-04-23 RX ORDER — POTASSIUM CHLORIDE 20 MEQ/1
20 TABLET, EXTENDED RELEASE ORAL 2 TIMES DAILY
Qty: 6 TABLET | Refills: 0 | Status: SHIPPED | OUTPATIENT
Start: 2021-04-23 | End: 2021-04-26

## 2021-04-23 RX ORDER — POTASSIUM CHLORIDE 20 MEQ/1
40 TABLET, EXTENDED RELEASE ORAL ONCE
Status: DISCONTINUED | OUTPATIENT
Start: 2021-04-23 | End: 2021-04-23

## 2021-04-23 RX ORDER — ONDANSETRON 4 MG/1
4 TABLET, ORALLY DISINTEGRATING ORAL EVERY 4 HOURS PRN
Qty: 10 TABLET | Refills: 0 | Status: SHIPPED | OUTPATIENT
Start: 2021-04-23 | End: 2021-04-30

## 2021-04-23 RX ORDER — POTASSIUM CHLORIDE 20 MEQ/1
20 TABLET, EXTENDED RELEASE ORAL ONCE
Status: COMPLETED | OUTPATIENT
Start: 2021-04-23 | End: 2021-04-23

## 2021-04-23 NOTE — ED INITIAL ASSESSMENT (HPI)
Pt states he started vomiting yesterday. C/o onset of nausea last evening with chills, denies diarrhea.  C/o cramping pain to lower abdomen post vomiting

## 2021-04-23 NOTE — ED PROVIDER NOTES
Patient Seen in: [facility] In [facility]      History   Patient presents with:  Nausea/Vomiting/Diarrhea  Body ache and/or chills    Stated Complaint: vomiting, chills    HPI/Subjective:   HPI    79-year-old male with known history of p PERCUTANEOUS  TRANSLUMINAL CORONARY ANGIOPLASTY     • CHOLECYSTECTOMY     • LAPAROSCOPIC CHOLECYSTECTOMY N/A 3/25/2015    Procedure: LAPAROSCOPIC CHOLECYSTECTOMY;  Surgeon: Sim Johnson DO;  Location: [facility] MAIN OR   • LAPAROSCOPY, SURGICAL; NEPHRECTOMY Rate and Rhythm: Normal rate and regular rhythm. Heart sounds: Normal heart sounds. Pulmonary:      Effort: Pulmonary effort is normal. No respiratory distress. Breath sounds: Normal breath sounds. No wheezing or rales.    Abdominal:      Genera PLATELET    Narrative: The following orders were created for panel order CBC WITH DIFFERENTIAL WITH PLATELET.   Procedure                               Abnormality         Status                     ---------                               ----------- calcifications are noted. RETROPERITONEUM:  No mass or adenopathy. BOWEL/MESENTERY:  There is diverticulosis of the colon without evidence of acute diverticulitis. ABDOMINAL WALL:  Tiny fat containing right inguinal hernia.  URINARY BLADDER:  No visible fo Patient was discharged home he verbalizes understanding of close follow-up.       Disposition and Plan     Clinical Impression:  Weakness generalized  (primary encounter diagnosis)  Hypokalemia  Nausea and vomiting in adult  Anorexia  LLQ abdominal pain

## 2021-07-10 ENCOUNTER — HOSPITAL ENCOUNTER (OUTPATIENT)
Facility: HOSPITAL | Age: 86
Setting detail: OBSERVATION
Discharge: HOME | End: 2021-07-11
Attending: EMERGENCY MEDICINE | Admitting: HOSPITALIST
Payer: MEDICARE

## 2021-07-10 ENCOUNTER — APPOINTMENT (OUTPATIENT)
Dept: GENERAL RADIOLOGY | Facility: HOSPITAL | Age: 86
End: 2021-07-10
Attending: EMERGENCY MEDICINE
Payer: MEDICARE

## 2021-07-10 ENCOUNTER — APPOINTMENT (OUTPATIENT)
Dept: CT IMAGING | Facility: HOSPITAL | Age: 86
End: 2021-07-10
Attending: EMERGENCY MEDICINE
Payer: MEDICARE

## 2021-07-10 DIAGNOSIS — I16.0 HYPERTENSIVE URGENCY: ICD-10-CM

## 2021-07-10 DIAGNOSIS — R07.9 ACUTE CHEST PAIN: Primary | ICD-10-CM

## 2021-07-10 DIAGNOSIS — S30.1XXA CONTUSION OF FLANK, INITIAL ENCOUNTER: ICD-10-CM

## 2021-07-10 DIAGNOSIS — R00.1 BRADYCARDIA: ICD-10-CM

## 2021-07-10 PROBLEM — E87.6 HYPOKALEMIA: Status: ACTIVE | Noted: 2021-07-10

## 2021-07-10 LAB
ALBUMIN SERPL-MCNC: 3.1 G/DL (ref 3.4–5)
ALBUMIN/GLOB SERPL: 1 {RATIO} (ref 1–2)
ALP LIVER SERPL-CCNC: 42 U/L
ALT SERPL-CCNC: 16 U/L
ANION GAP SERPL CALC-SCNC: 5 MMOL/L (ref 0–18)
AST SERPL-CCNC: 17 U/L (ref 15–37)
ATRIAL RATE: 59 BPM
BASOPHILS # BLD AUTO: 0.03 X10(3) UL (ref 0–0.2)
BASOPHILS NFR BLD AUTO: 0.5 %
BILIRUB SERPL-MCNC: 0.8 MG/DL (ref 0.1–2)
BILIRUB UR QL STRIP.AUTO: NEGATIVE
BUN BLD-MCNC: 12 MG/DL (ref 7–18)
BUN/CREAT SERPL: 9.9 (ref 10–20)
CALCIUM BLD-MCNC: 8.1 MG/DL (ref 8.5–10.1)
CHLORIDE SERPL-SCNC: 110 MMOL/L (ref 98–112)
CLARITY UR REFRACT.AUTO: CLEAR
CO2 SERPL-SCNC: 26 MMOL/L (ref 21–32)
COLOR UR AUTO: YELLOW
CREAT BLD-MCNC: 1.21 MG/DL
DEPRECATED RDW RBC AUTO: 51.9 FL (ref 35.1–46.3)
EOSINOPHIL # BLD AUTO: 0.15 X10(3) UL (ref 0–0.7)
EOSINOPHIL NFR BLD AUTO: 2.5 %
ERYTHROCYTE [DISTWIDTH] IN BLOOD BY AUTOMATED COUNT: 14.6 % (ref 11–15)
GLOBULIN PLAS-MCNC: 3 G/DL (ref 2.8–4.4)
GLUCOSE BLD-MCNC: 119 MG/DL (ref 70–99)
GLUCOSE UR STRIP.AUTO-MCNC: NEGATIVE MG/DL
HCT VFR BLD AUTO: 37.6 %
HGB BLD-MCNC: 12.8 G/DL
IMM GRANULOCYTES # BLD AUTO: 0.01 X10(3) UL (ref 0–1)
IMM GRANULOCYTES NFR BLD: 0.2 %
KETONES UR STRIP.AUTO-MCNC: NEGATIVE MG/DL
LEUKOCYTE ESTERASE UR QL STRIP.AUTO: NEGATIVE
LYMPHOCYTES # BLD AUTO: 0.92 X10(3) UL (ref 1–4)
LYMPHOCYTES NFR BLD AUTO: 15.3 %
M PROTEIN MFR SERPL ELPH: 6.1 G/DL (ref 6.4–8.2)
MCH RBC QN AUTO: 32.6 PG (ref 26–34)
MCHC RBC AUTO-ENTMCNC: 34 G/DL (ref 31–37)
MCV RBC AUTO: 95.7 FL
MONOCYTES # BLD AUTO: 0.54 X10(3) UL (ref 0.1–1)
MONOCYTES NFR BLD AUTO: 9 %
NEUTROPHILS # BLD AUTO: 4.37 X10 (3) UL (ref 1.5–7.7)
NEUTROPHILS # BLD AUTO: 4.37 X10(3) UL (ref 1.5–7.7)
NEUTROPHILS NFR BLD AUTO: 72.5 %
NITRITE UR QL STRIP.AUTO: NEGATIVE
OSMOLALITY SERPL CALC.SUM OF ELEC: 293 MOSM/KG (ref 275–295)
P AXIS: 44 DEGREES
P-R INTERVAL: 136 MS
PH UR STRIP.AUTO: 6 [PH] (ref 5–8)
PLATELET # BLD AUTO: 162 10(3)UL (ref 150–450)
POTASSIUM SERPL-SCNC: 3.3 MMOL/L (ref 3.5–5.1)
POTASSIUM SERPL-SCNC: 4 MMOL/L (ref 3.5–5.1)
PROT UR STRIP.AUTO-MCNC: NEGATIVE MG/DL
Q-T INTERVAL: 520 MS
QRS DURATION: 96 MS
QTC CALCULATION (BEZET): 514 MS
R AXIS: -51 DEGREES
RBC # BLD AUTO: 3.93 X10(6)UL
RBC UR QL AUTO: NEGATIVE
SODIUM SERPL-SCNC: 141 MMOL/L (ref 136–145)
SP GR UR STRIP.AUTO: 1.01 (ref 1–1.03)
T AXIS: -23 DEGREES
TROPONIN I SERPL-MCNC: <0.045 NG/ML (ref ?–0.04)
UROBILINOGEN UR STRIP.AUTO-MCNC: <2 MG/DL
VENTRICULAR RATE: 59 BPM
WBC # BLD AUTO: 6 X10(3) UL (ref 4–11)

## 2021-07-10 PROCEDURE — 99220 INITIAL OBSERVATION CARE,LEVL III: CPT | Performed by: HOSPITALIST

## 2021-07-10 PROCEDURE — 72100 X-RAY EXAM L-S SPINE 2/3 VWS: CPT | Performed by: EMERGENCY MEDICINE

## 2021-07-10 PROCEDURE — 70450 CT HEAD/BRAIN W/O DYE: CPT | Performed by: EMERGENCY MEDICINE

## 2021-07-10 PROCEDURE — 71101 X-RAY EXAM UNILAT RIBS/CHEST: CPT | Performed by: EMERGENCY MEDICINE

## 2021-07-10 RX ORDER — ONDANSETRON 2 MG/ML
4 INJECTION INTRAMUSCULAR; INTRAVENOUS EVERY 6 HOURS PRN
Status: DISCONTINUED | OUTPATIENT
Start: 2021-07-10 | End: 2021-07-11

## 2021-07-10 RX ORDER — EZETIMIBE 10 MG/1
10 TABLET ORAL NIGHTLY
Status: DISCONTINUED | OUTPATIENT
Start: 2021-07-10 | End: 2021-07-11

## 2021-07-10 RX ORDER — ENOXAPARIN SODIUM 100 MG/ML
40 INJECTION SUBCUTANEOUS NIGHTLY
Status: DISCONTINUED | OUTPATIENT
Start: 2021-07-10 | End: 2021-07-11

## 2021-07-10 RX ORDER — GABAPENTIN 100 MG/1
100 CAPSULE ORAL 2 TIMES DAILY
Status: DISCONTINUED | OUTPATIENT
Start: 2021-07-10 | End: 2021-07-11

## 2021-07-10 RX ORDER — TRAZODONE HYDROCHLORIDE 50 MG/1
50 TABLET ORAL NIGHTLY
Status: DISCONTINUED | OUTPATIENT
Start: 2021-07-10 | End: 2021-07-11

## 2021-07-10 RX ORDER — PANTOPRAZOLE SODIUM 40 MG/1
40 TABLET, DELAYED RELEASE ORAL
Status: DISCONTINUED | OUTPATIENT
Start: 2021-07-11 | End: 2021-07-11

## 2021-07-10 RX ORDER — PREDNISONE 1 MG/1
5 TABLET ORAL DAILY
Status: DISCONTINUED | OUTPATIENT
Start: 2021-07-10 | End: 2021-07-11

## 2021-07-10 RX ORDER — MORPHINE SULFATE 4 MG/ML
4 INJECTION, SOLUTION INTRAMUSCULAR; INTRAVENOUS ONCE
Status: COMPLETED | OUTPATIENT
Start: 2021-07-10 | End: 2021-07-10

## 2021-07-10 RX ORDER — ACETAMINOPHEN 325 MG/1
650 TABLET ORAL EVERY 6 HOURS PRN
Status: DISCONTINUED | OUTPATIENT
Start: 2021-07-10 | End: 2021-07-11

## 2021-07-10 RX ORDER — METOCLOPRAMIDE HYDROCHLORIDE 5 MG/ML
5 INJECTION INTRAMUSCULAR; INTRAVENOUS EVERY 8 HOURS PRN
Status: DISCONTINUED | OUTPATIENT
Start: 2021-07-10 | End: 2021-07-11

## 2021-07-10 RX ORDER — POTASSIUM CHLORIDE 20 MEQ/1
40 TABLET, EXTENDED RELEASE ORAL ONCE
Status: COMPLETED | OUTPATIENT
Start: 2021-07-10 | End: 2021-07-10

## 2021-07-10 RX ORDER — OXYBUTYNIN CHLORIDE 10 MG/1
10 TABLET, EXTENDED RELEASE ORAL WEEKLY
Status: DISCONTINUED | OUTPATIENT
Start: 2021-07-16 | End: 2021-07-11

## 2021-07-10 RX ORDER — ROSUVASTATIN CALCIUM 20 MG/1
20 TABLET, COATED ORAL NIGHTLY
Status: DISCONTINUED | OUTPATIENT
Start: 2021-07-10 | End: 2021-07-11

## 2021-07-10 RX ORDER — ONDANSETRON 2 MG/ML
4 INJECTION INTRAMUSCULAR; INTRAVENOUS ONCE
Status: COMPLETED | OUTPATIENT
Start: 2021-07-10 | End: 2021-07-10

## 2021-07-10 RX ORDER — ESCITALOPRAM OXALATE 20 MG/1
20 TABLET ORAL DAILY
Status: DISCONTINUED | OUTPATIENT
Start: 2021-07-10 | End: 2021-07-11

## 2021-07-10 RX ORDER — CLOPIDOGREL BISULFATE 75 MG/1
75 TABLET ORAL DAILY
Status: DISCONTINUED | OUTPATIENT
Start: 2021-07-10 | End: 2021-07-11

## 2021-07-10 RX ORDER — CETIRIZINE HYDROCHLORIDE 10 MG/1
10 TABLET ORAL DAILY
Status: DISCONTINUED | OUTPATIENT
Start: 2021-07-10 | End: 2021-07-11

## 2021-07-10 RX ORDER — ASPIRIN 81 MG/1
324 TABLET, CHEWABLE ORAL ONCE
Status: COMPLETED | OUTPATIENT
Start: 2021-07-10 | End: 2021-07-10

## 2021-07-10 RX ORDER — LEVOTHYROXINE SODIUM 0.03 MG/1
25 TABLET ORAL
Status: DISCONTINUED | OUTPATIENT
Start: 2021-07-11 | End: 2021-07-11

## 2021-07-10 NOTE — ED INITIAL ASSESSMENT (HPI)
Pt to ED with family with concerns for elevated blood pressure readings on Thursday at cancer center. Pt reports blood pressure was high, also has had headache for a couple weeks.

## 2021-07-10 NOTE — ED QUICK NOTES
Pt complaining of chest tightness at this time. Rated 7/10. Pt HR in 40s on monitor. Repeat EKG ordered.

## 2021-07-10 NOTE — ED PROVIDER NOTES
Patient Seen in: [facility] Emergency Department      History   Patient presents with:  Hypertension    Stated Complaint: htn    HPI/Subjective:   HPI    Patient presents with hypertension, headache and back pain.   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 Respiratory: Lungs clear to auscultation but diminished. Healing wound to the left posterior lower rib cage where there is moderate surrounding bruising and some associated tenderness, no crepitus.   Back: No midline spinal tenderness 59  Rhythm: Sinus bradycardia with PACs  Reading: Left anterior fascicular block, minimal voltage criteria for LVH, nonspecific ST and T wave abnormality, prolonged QT interval-overall appears similar to previous         EKG    Rate, intervals and axes as acute intracranial abnormality identified. 2. Moderate age-indeterminate microvascular ischemic changes in the cerebral white matter. Age-indeterminate lacunar infarcts in the basal ganglia.  If there is clinical concern for acute ischemia/infarction, an obtained  COMPARISON:  [facility], CT, CT ABDOMEN PELVIS IV CONTRAST, NO ORAL (ER), 4/23/2021, 3:54 PM.  [facility], XR, XR CHEST AP PORTABLE  (CPT=71045), 1/23/2021, 12:16 PM.  INDICATIONS:  trauma, pain  PATIENT STATED HISTORY: (As transcribed by Veta Bosworth Plan     Clinical Impression:  Acute chest pain  (primary encounter diagnosis)  Hypertensive urgency  Contusion of flank, initial encounter  Bradycardia     Disposition:  Admit  7/10/2021  4:07 pm    Follow-up:  No follow-up provider specified.         Medi

## 2021-07-10 NOTE — H&P
[facility] HOSPITALIST  History and Physical     Antonina Eugene Patient Status:  Emergency    1935 MRN [id number]   Location [address] Attending Karlo Ford MD   Hosp Day # 0 PCP Ena Clause     Chief Complaint: HT Laterality Date   • AMPUTATION AT SHOULDER JOINT Right 2010   • ANGIOGRAM     • ANGIOPLASTY (CORONARY)      4 stents to LAD   • APPENDECTOMY     • CATH PERCUTANEOUS  TRANSLUMINAL CORONARY ANGIOPLASTY     • CHOLECYSTECTOMY     • LAPAROSCOPIC CHOLECYSTECTOMY Tablet 24 Hr, Take 10 mg by mouth once a week. Pt take on different days of the week, varies. , Disp: , Rfl:   predniSONE 5 MG Oral Tab, Take 5 mg by mouth daily. , Disp: , Rfl:   Rosuvastatin Calcium 20 MG Oral Tab, Take 20 mg by mouth nightly., Disp: , Rf SpO2 95%   BMI 25.22 kg/m²   General: No acute distress. Alert and oriented x 3. HEENT: Normocephalic atraumatic. Moist mucous membranes. EOM-I. PERRLA. Anicteric. Neck: No lymphadenopathy. No JVD. No carotid bruits.   Respiratory: Clear to auscultation b cancer  4. CAD, as above  5. ANN  6. DL, statin  7. Hypothyroid, Synthroid  8. Depression, anxiety, resume home meds  9.  GERD    Quality:  · DVT Prophylaxis: lovenox  · CODE status: Full  · Rodriguez: no  · If COVID testing is negative, may discontinue isolati

## 2021-07-11 ENCOUNTER — APPOINTMENT (OUTPATIENT)
Dept: CV DIAGNOSTICS | Facility: HOSPITAL | Age: 86
End: 2021-07-11
Attending: INTERNAL MEDICINE
Payer: MEDICARE

## 2021-07-11 VITALS
HEIGHT: 67 IN | BODY MASS INDEX: 25.27 KG/M2 | OXYGEN SATURATION: 98 % | TEMPERATURE: 97 F | RESPIRATION RATE: 18 BRPM | WEIGHT: 161 LBS | SYSTOLIC BLOOD PRESSURE: 143 MMHG | DIASTOLIC BLOOD PRESSURE: 63 MMHG | HEART RATE: 69 BPM

## 2021-07-11 LAB
ATRIAL RATE: 50 BPM
P AXIS: 51 DEGREES
P-R INTERVAL: 144 MS
Q-T INTERVAL: 502 MS
QRS DURATION: 98 MS
QTC CALCULATION (BEZET): 457 MS
R AXIS: -44 DEGREES
T AXIS: -38 DEGREES
TROPONIN I SERPL-MCNC: <0.045 NG/ML (ref ?–0.04)
VENTRICULAR RATE: 50 BPM

## 2021-07-11 PROCEDURE — 99217 OBSERVATION CARE DISCHARGE: CPT | Performed by: HOSPITALIST

## 2021-07-11 PROCEDURE — 93306 TTE W/DOPPLER COMPLETE: CPT | Performed by: INTERNAL MEDICINE

## 2021-07-11 NOTE — PROGRESS NOTES
07/10/21 1830 07/10/21 1832 07/10/21 1833   Vital Signs   Temp 98 °F (36.7 °C)  --   --    Temp src Oral  --   --    Pulse 59 54 58   Heart Rate Source Monitor Monitor Monitor   Resp 18 20 18   Respiratory Quality Normal Normal Normal   BP (!) 171/81 14

## 2021-07-11 NOTE — PLAN OF CARE
Assumed care of patient 7/10/21 1930. Pt is alert, oriented x4. O2 sats > 94% on RA. Lungs CTA. SB on tele. +orthostatics, asymptomatic. Continent. Denies pain. Up w/sba.  Plan: cards to see in AM, 2D echo      Problem: Patient/Family Goals  Goal: Patient/F

## 2021-07-11 NOTE — PROGRESS NOTES
[facility] HOSPITALIST  Progress Note     [facility] Patient Status:  Observation    1935 MRN [id number]   [facility] 2NE-A Attending Niecy Jimenes MD   Hosp Day # 0 PCP Robert Paige     Chief Complaint: elevated BP    S: Patient doi results for input(s): PCT in the last 168 hours. Cardiac  Recent Labs   Lab 07/10/21  1708 07/10/21  2140 07/11/21  0712   TROP <0.045 <0.045 <0.045       Creatinine Kinase  No results for input(s): CK in the last 168 hours.     Inflammatory Markers  No

## 2021-07-11 NOTE — PROGRESS NOTES
Problem: Patient/Family Goals  Goal: Patient/Family Long Term Goal  Description: Patient's Long Term Goal: Home     Interventions:  - cardiology consult   -echo  - See additional Care Plan goals for specific interventions  Outcome: Progressing  Goal: Patie

## 2021-07-11 NOTE — PROGRESS NOTES
Up walking in the hallway. Denies c/o discomfort. Wife on bedside. Echo unremarkable. DC per instruction by Lionel Garcia. DC instruction given-Verbalized understanding. DC tele. [facility].

## 2021-07-11 NOTE — CONSULTS
[facility] ([facility]3]    PATIENT'S NAME: Aurea Sarmiento   ATTENDING PHYSICIAN: Gris Davenport M.D.   Jasmeet Castor PHYSICIAN: Vivian Andrews M.D.    PATIENT ACCOUNT#:   [de-identified]    LOCATION:  [address] [place] [address]  MEDICAL RECORD #:   [id number]       DATE OF BIRTH: Lexapro, gabapentin, Zetia, Synthroid, Xyzal, Protonix. ALLERGIES:  Drug allergies include Levaquin. CARDIAC RISK FACTORS:  Include no cigarette smoking; quit a pipe in 1997. He has hypertension and hyperlipidemia but no diabetes.     SOCIAL HISTORY: his p.r.n. hydralazine. 2.   Echo is pending. 3.   Coronary disease. He is without any chest pain. 4.   Negative troponins, no chest pain, and a negative nuclear study 09/2019 at [facility]. No further workup. 5.   Continue Crestor for lipids.   6.   Echo i

## 2021-08-04 NOTE — DISCHARGE SUMMARY
[facility] HOSPITALIST  DISCHARGE SUMMARY     Lipatito Piña Patient Status:  Observation    1935 MRN [id number]   [facility] 2NE-A Attending No att. providers found   Hosp Day # 0 SIGIFREDO Iqbal     Date of Admission: 7/10/2021  Date o hospitalization:   • none    Incidental or significant findings and recommendations (brief descriptions):  • As above    Lab/Test results pending at Discharge:   · none    Consultants:  • Cardiology    Discharge Medication List:     Discharge Medications 0     oxybutynin 10 MG Tb24  Commonly known as: DITROPAN-XL      Take 10 mg by mouth once a week. Pt take on different days of the week, varies. Refills: 0     PEG 3350 17 g Pack  Commonly known as: MIRALAX      Take 17 g by mouth daily as needed.    Refi

## 2021-10-03 ENCOUNTER — HOSPITAL ENCOUNTER (EMERGENCY)
Facility: HOSPITAL | Age: 86
Discharge: HOME | End: 2021-10-03
Attending: EMERGENCY MEDICINE
Payer: MEDICARE

## 2021-10-03 ENCOUNTER — APPOINTMENT (OUTPATIENT)
Dept: GENERAL RADIOLOGY | Facility: HOSPITAL | Age: 86
End: 2021-10-03
Attending: EMERGENCY MEDICINE
Payer: MEDICARE

## 2021-10-03 ENCOUNTER — APPOINTMENT (OUTPATIENT)
Dept: CT IMAGING | Facility: HOSPITAL | Age: 86
End: 2021-10-03
Attending: EMERGENCY MEDICINE
Payer: MEDICARE

## 2021-10-03 VITALS
SYSTOLIC BLOOD PRESSURE: 186 MMHG | BODY MASS INDEX: 24.33 KG/M2 | HEART RATE: 62 BPM | RESPIRATION RATE: 16 BRPM | WEIGHT: 155 LBS | DIASTOLIC BLOOD PRESSURE: 105 MMHG | HEIGHT: 67 IN | OXYGEN SATURATION: 94 % | TEMPERATURE: 97 F

## 2021-10-03 DIAGNOSIS — M48.54XD NON-TRAUMATIC COMPRESSION FRACTURE OF T11 THORACIC VERTEBRA WITH ROUTINE HEALING, SUBSEQUENT ENCOUNTER: ICD-10-CM

## 2021-10-03 DIAGNOSIS — M54.9 MODERATE BACK PAIN: ICD-10-CM

## 2021-10-03 DIAGNOSIS — R19.7 NAUSEA VOMITING AND DIARRHEA: Primary | ICD-10-CM

## 2021-10-03 DIAGNOSIS — R11.2 NAUSEA VOMITING AND DIARRHEA: Primary | ICD-10-CM

## 2021-10-03 PROCEDURE — 96374 THER/PROPH/DIAG INJ IV PUSH: CPT

## 2021-10-03 PROCEDURE — 93005 ELECTROCARDIOGRAM TRACING: CPT

## 2021-10-03 PROCEDURE — 96361 HYDRATE IV INFUSION ADD-ON: CPT

## 2021-10-03 PROCEDURE — 74177 CT ABD & PELVIS W/CONTRAST: CPT | Performed by: EMERGENCY MEDICINE

## 2021-10-03 PROCEDURE — 71045 X-RAY EXAM CHEST 1 VIEW: CPT | Performed by: EMERGENCY MEDICINE

## 2021-10-03 PROCEDURE — 99284 EMERGENCY DEPT VISIT MOD MDM: CPT

## 2021-10-03 PROCEDURE — 85025 COMPLETE CBC W/AUTO DIFF WBC: CPT | Performed by: EMERGENCY MEDICINE

## 2021-10-03 PROCEDURE — 80053 COMPREHEN METABOLIC PANEL: CPT | Performed by: EMERGENCY MEDICINE

## 2021-10-03 PROCEDURE — 96375 TX/PRO/DX INJ NEW DRUG ADDON: CPT

## 2021-10-03 PROCEDURE — 71275 CT ANGIOGRAPHY CHEST: CPT | Performed by: EMERGENCY MEDICINE

## 2021-10-03 PROCEDURE — 99285 EMERGENCY DEPT VISIT HI MDM: CPT

## 2021-10-03 RX ORDER — METOCLOPRAMIDE 10 MG/1
5 TABLET ORAL 3 TIMES DAILY PRN
Qty: 20 TABLET | Refills: 0 | Status: SHIPPED | OUTPATIENT
Start: 2021-10-03 | End: 2021-11-02

## 2021-10-03 RX ORDER — ONDANSETRON 2 MG/ML
4 INJECTION INTRAMUSCULAR; INTRAVENOUS ONCE
Status: COMPLETED | OUTPATIENT
Start: 2021-10-03 | End: 2021-10-03

## 2021-10-03 RX ORDER — LABETALOL HYDROCHLORIDE 5 MG/ML
20 INJECTION, SOLUTION INTRAVENOUS ONCE
Status: COMPLETED | OUTPATIENT
Start: 2021-10-03 | End: 2021-10-03

## 2021-10-03 RX ORDER — HYDROCODONE BITARTRATE AND ACETAMINOPHEN 5; 325 MG/1; MG/1
1 TABLET ORAL ONCE
Status: COMPLETED | OUTPATIENT
Start: 2021-10-03 | End: 2021-10-03

## 2021-10-03 NOTE — ED PROVIDER NOTES
Patient Seen in: [facility] Emergency Department      History   Patient presents with:  Dehydration    Stated Complaint: dehydration, vomiting    Subjective:   HPI    This is a 45-year-old male who presents with complaints of dehydration, vomiting t • CHOLECYSTECTOMY     • LAPAROSCOPIC CHOLECYSTECTOMY N/A 3/25/2015    Procedure: LAPAROSCOPIC CHOLECYSTECTOMY;  Surgeon: Jimy Grant DO;  Location: [facility] MAIN OR   • LAPAROSCOPY, SURGICAL; NEPHRECTOMY      Right Kidney   • OTHER SURGICAL HISTORY Right 1 abdomen. EXT: There is good pulses bilaterally. There is no calf tenderness. There is no rash noted. There is no edema    NEURO: Alert and oriented x3.   Muscle strength is 5 out of 5 he has no pronator drift his sensory exam is normal he has no facia could not take any of his medicines this morning. The patient was given        IV fluids, he was given labetalol IV.   He does have pain in his lower back which she says is chronic pain from his prostate cancer he has been seen before he is on pain medicin in a short period of time. I discussed with them that there is always a possibility that things can change and a need reevaluation with their primary care physician as soon as possible.   I've also discussed with them that if the pain gets worse to return

## 2021-10-03 NOTE — ED NOTES
Th the patient did have some back pain most likely this is chronic back pain but because of the elevated blood pressure a CTA chest was done to rule out any acute pathology        XR CHEST AP PORTABLE  (CPT=71045)    Result Date: 10/3/2021  PROCEDURE:  XR mediastinal or hilar adenopathy. CARDIAC:  No significant pericardial effusion. PLEURA:  No pneumothorax or effusion. CHEST WALL:  No enlarged axillary adenopathy. AORTA:  No aneurysm or dissection.   VASCULATURE:  No visible pulmonary arterial thrombus them  in a short period of time. I discussed with them that there is always a possibility that things can change and a need reevaluation with their primary care physician as soon as possible.   I've also discussed with them that if the pain gets worse to r

## 2021-10-05 PROBLEM — R12 HEARTBURN: Status: ACTIVE | Noted: 2021-10-05

## 2021-10-05 PROBLEM — H40.1113 PRIMARY OPEN ANGLE GLAUCOMA OF RIGHT EYE, SEVERE STAGE: Status: ACTIVE | Noted: 2018-05-24

## 2021-10-05 PROBLEM — R25.1 TREMOR: Status: ACTIVE | Noted: 2018-05-07

## 2021-10-05 PROBLEM — M65.332 TRIGGER MIDDLE FINGER OF LEFT HAND: Status: ACTIVE | Noted: 2018-07-13

## 2021-10-05 PROBLEM — C61 PROSTATE CANCER (HCC): Status: ACTIVE | Noted: 2019-09-24

## 2021-10-05 PROBLEM — C80.1 MALIGNANT NEOPLASM (HCC): Status: ACTIVE | Noted: 2021-10-05

## 2021-10-05 PROBLEM — H40.9 GLAUCOMA: Status: ACTIVE | Noted: 2021-01-27

## 2021-10-05 PROBLEM — M81.0 AGE-RELATED OSTEOPOROSIS WITHOUT CURRENT PATHOLOGICAL FRACTURE: Status: ACTIVE | Noted: 2020-01-26

## 2021-10-05 PROBLEM — C79.51 SECONDARY MALIGNANT NEOPLASM OF BONE (HCC): Status: ACTIVE | Noted: 2019-09-27

## 2021-10-05 PROBLEM — T78.40XA ALLERGY: Status: ACTIVE | Noted: 2021-10-05

## 2021-10-05 PROBLEM — R68.2 DRY MOUTH: Status: ACTIVE | Noted: 2020-06-16

## 2021-10-05 PROBLEM — K43.9 ABDOMINAL WALL HERNIA: Status: ACTIVE | Noted: 2018-07-31

## 2021-10-05 PROBLEM — F43.10 POSTTRAUMATIC STRESS DISORDER: Status: ACTIVE | Noted: 2021-10-05

## 2021-10-05 PROBLEM — F99 INSOMNIA DUE TO OTHER MENTAL DISORDER: Status: ACTIVE | Noted: 2018-07-31

## 2021-10-05 PROBLEM — F41.9 ANXIETY: Status: ACTIVE | Noted: 2021-10-05

## 2021-10-05 PROBLEM — R31.9 HEMATURIA: Status: ACTIVE | Noted: 2019-07-01

## 2021-10-05 PROBLEM — F32.A DEPRESSION: Status: ACTIVE | Noted: 2021-10-05

## 2021-10-05 PROBLEM — M81.8 OTHER OSTEOPOROSIS WITHOUT CURRENT PATHOLOGICAL FRACTURE: Status: ACTIVE | Noted: 2019-11-20

## 2021-10-05 PROBLEM — F51.05 INSOMNIA DUE TO OTHER MENTAL DISORDER: Status: ACTIVE | Noted: 2018-07-31

## 2021-10-09 ENCOUNTER — APPOINTMENT (OUTPATIENT)
Dept: CT IMAGING | Facility: HOSPITAL | Age: 86
End: 2021-10-09
Attending: EMERGENCY MEDICINE
Payer: MEDICARE

## 2021-10-09 ENCOUNTER — HOSPITAL ENCOUNTER (EMERGENCY)
Facility: HOSPITAL | Age: 86
Discharge: HOME | End: 2021-10-10
Attending: EMERGENCY MEDICINE
Payer: MEDICARE

## 2021-10-09 DIAGNOSIS — R10.9 ABDOMINAL PAIN OF UNKNOWN ETIOLOGY: ICD-10-CM

## 2021-10-09 DIAGNOSIS — R11.10 VOMITING, INTRACTABILITY OF VOMITING NOT SPECIFIED, PRESENCE OF NAUSEA NOT SPECIFIED, UNSPECIFIED VOMITING TYPE: Primary | ICD-10-CM

## 2021-10-09 PROCEDURE — 80053 COMPREHEN METABOLIC PANEL: CPT | Performed by: EMERGENCY MEDICINE

## 2021-10-09 PROCEDURE — 74177 CT ABD & PELVIS W/CONTRAST: CPT | Performed by: EMERGENCY MEDICINE

## 2021-10-09 PROCEDURE — 99284 EMERGENCY DEPT VISIT MOD MDM: CPT | Performed by: EMERGENCY MEDICINE

## 2021-10-09 PROCEDURE — 96361 HYDRATE IV INFUSION ADD-ON: CPT | Performed by: EMERGENCY MEDICINE

## 2021-10-09 PROCEDURE — 99285 EMERGENCY DEPT VISIT HI MDM: CPT | Performed by: EMERGENCY MEDICINE

## 2021-10-09 PROCEDURE — 96360 HYDRATION IV INFUSION INIT: CPT | Performed by: EMERGENCY MEDICINE

## 2021-10-09 PROCEDURE — 85025 COMPLETE CBC W/AUTO DIFF WBC: CPT | Performed by: EMERGENCY MEDICINE

## 2021-10-09 PROCEDURE — 83690 ASSAY OF LIPASE: CPT | Performed by: EMERGENCY MEDICINE

## 2021-10-10 VITALS
HEIGHT: 67 IN | WEIGHT: 150 LBS | HEART RATE: 74 BPM | DIASTOLIC BLOOD PRESSURE: 92 MMHG | TEMPERATURE: 97 F | SYSTOLIC BLOOD PRESSURE: 201 MMHG | RESPIRATION RATE: 9 BRPM | BODY MASS INDEX: 23.54 KG/M2 | OXYGEN SATURATION: 96 %

## 2021-10-10 NOTE — ED QUICK NOTES
Spoke with Dr. Alem Webb, regarding patient high blood pressure. No interventions requested at this time. Needs to follow up with PCP.

## 2021-10-10 NOTE — ED INITIAL ASSESSMENT (HPI)
PT TO ED FROM HOME WITH C/O DEHYDRATION, UNABLE TO TOLERATE PO FOOD AND FLUIDS X 1 WEEK. + VOMITING. WAS SEEN IN ED ON Sunday. MID ABDOMINAL PAIN X 1 WEEK, PT REPORTS DIARRHEA ALTERNATING WITH CONSTIPATION.

## 2021-10-10 NOTE — ED PROVIDER NOTES
Patient Seen in: [facility] Emergency Department      History   Patient presents with:  Abdomen/Flank Pain    Stated Complaint: abd pain    Subjective:   HPI    24-year-old male who presents to the emergency department stated he has had difficulty e 2010   • ANGIOGRAM     • ANGIOPLASTY (CORONARY)      4 stents to LAD   • APPENDECTOMY     • CATH PERCUTANEOUS  TRANSLUMINAL CORONARY ANGIOPLASTY     • CHOLECYSTECTOMY     • LAPAROSCOPIC CHOLECYSTECTOMY N/A 3/25/2015    Procedure: Soniya Corona or rales appreciated. No accessory muscle use noted for breathing. Cardiac: Regular rate and rhythm. Normal S1 and 2 without murmurs or ectopy appreciated  Abdomen: Soft on examination without tenderness to deep palpation or to percussion.   No masses ap non-ionic intravenous contrast material. Post contrast coronal MPR imaging was performed.  Dose reduction techniques were used.  Dose information is   transmitted to the [facility] ([address] [place] [address] of Radiology) Eduin Dye [address] ([address]) which inclu was essentially normal.  Hematocrit of 37.1. Comparing patient's laboratories today from his previous laboratories did not yield any significant changes.   This is despite the patient saying that he had difficulty with eating and drinking over the past wee

## 2021-11-18 ENCOUNTER — APPOINTMENT (OUTPATIENT)
Dept: CT IMAGING | Age: 86
End: 2021-11-18
Attending: EMERGENCY MEDICINE
Payer: MEDICARE

## 2021-11-18 ENCOUNTER — APPOINTMENT (OUTPATIENT)
Dept: GENERAL RADIOLOGY | Age: 86
End: 2021-11-18
Attending: EMERGENCY MEDICINE
Payer: MEDICARE

## 2021-11-18 ENCOUNTER — HOSPITAL ENCOUNTER (EMERGENCY)
Age: 86
Discharge: HOME | End: 2021-11-18
Attending: EMERGENCY MEDICINE
Payer: MEDICARE

## 2021-11-18 VITALS
TEMPERATURE: 98 F | HEART RATE: 63 BPM | RESPIRATION RATE: 18 BRPM | OXYGEN SATURATION: 98 % | SYSTOLIC BLOOD PRESSURE: 202 MMHG | DIASTOLIC BLOOD PRESSURE: 87 MMHG | HEIGHT: 67 IN | WEIGHT: 156 LBS | BODY MASS INDEX: 24.48 KG/M2

## 2021-11-18 DIAGNOSIS — S01.112A LACERATION OF EYEBROW AND FOREHEAD, LEFT, INITIAL ENCOUNTER: ICD-10-CM

## 2021-11-18 DIAGNOSIS — S09.90XA INJURY OF HEAD, INITIAL ENCOUNTER: Primary | ICD-10-CM

## 2021-11-18 DIAGNOSIS — S43.402A SPRAIN OF LEFT SHOULDER, UNSPECIFIED SHOULDER SPRAIN TYPE, INITIAL ENCOUNTER: ICD-10-CM

## 2021-11-18 DIAGNOSIS — S01.81XA LACERATION OF EYEBROW AND FOREHEAD, LEFT, INITIAL ENCOUNTER: ICD-10-CM

## 2021-11-18 PROCEDURE — 12011 RPR F/E/E/N/L/M 2.5 CM/<: CPT

## 2021-11-18 PROCEDURE — 72125 CT NECK SPINE W/O DYE: CPT | Performed by: EMERGENCY MEDICINE

## 2021-11-18 PROCEDURE — 70450 CT HEAD/BRAIN W/O DYE: CPT | Performed by: EMERGENCY MEDICINE

## 2021-11-18 PROCEDURE — 76377 3D RENDER W/INTRP POSTPROCES: CPT | Performed by: EMERGENCY MEDICINE

## 2021-11-18 PROCEDURE — 70480 CT ORBIT/EAR/FOSSA W/O DYE: CPT | Performed by: EMERGENCY MEDICINE

## 2021-11-18 PROCEDURE — 99284 EMERGENCY DEPT VISIT MOD MDM: CPT

## 2021-11-18 PROCEDURE — 73030 X-RAY EXAM OF SHOULDER: CPT | Performed by: EMERGENCY MEDICINE

## 2021-11-18 RX ORDER — ACETAMINOPHEN 500 MG
1000 TABLET ORAL ONCE
Status: COMPLETED | OUTPATIENT
Start: 2021-11-18 | End: 2021-11-18

## 2021-11-18 NOTE — ED PROVIDER NOTES
Patient Seen in: THE 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 . Conjuctiva clear. Oropharynx is clear and moist.  Patient does have some left paraspinal cervical spine tenderness.   Lungs: Clear to auscultation bilaterally with no rales, no retractions, and no wheezing INTRACRANIAL:  Again noted is a rounded lesion along the left lateral aspect of the falx near the vertex measuring up to 1.3 cm in diameter. This most likely represents a small meningioma. No significant midline shift or mass effect.   No acute intracrani falling today, denies loc. FINDINGS:   Left supraorbital hematoma identified, maximum thickness from the skin surface to the bone surface 2.0 cm image 66. This is extending also superiorly into the left frontal scalp.   The oblique transverse dimension o degenerative changes present with areas of facet and uncovertebral hypertrophy. Moderate to severe right neural foraminal stenosis is present at C3-4. There is a posterior disc osteophyte complex noted at C4-5 as well as at C6-7.   Paravertebral soft tiss pressure medication when he gets home. He has not taken it yet today. We also discussed if he has a new headache or any concerns for head injury he should come back and get reimaged. He expresses understanding with plan of care.   Follow-up with jorgito

## 2021-11-18 NOTE — ED INITIAL ASSESSMENT (HPI)
Tripped on blanket on the floor. Hit head on the floor. Laceration and swelling to L eyebrow. C/O L shoulder pain.

## 2021-11-22 ENCOUNTER — APPOINTMENT (OUTPATIENT)
Dept: CT IMAGING | Facility: HOSPITAL | Age: 86
End: 2021-11-22
Attending: EMERGENCY MEDICINE
Payer: MEDICARE

## 2021-11-22 ENCOUNTER — HOSPITAL ENCOUNTER (EMERGENCY)
Facility: HOSPITAL | Age: 86
Discharge: HOME | End: 2021-11-23
Attending: EMERGENCY MEDICINE
Payer: MEDICARE

## 2021-11-22 DIAGNOSIS — E87.6 HYPOKALEMIA: ICD-10-CM

## 2021-11-22 DIAGNOSIS — R03.0 ELEVATED BLOOD PRESSURE READING: ICD-10-CM

## 2021-11-22 DIAGNOSIS — R11.2 NON-INTRACTABLE VOMITING WITH NAUSEA, UNSPECIFIED VOMITING TYPE: Primary | ICD-10-CM

## 2021-11-22 PROCEDURE — 85025 COMPLETE CBC W/AUTO DIFF WBC: CPT | Performed by: EMERGENCY MEDICINE

## 2021-11-22 PROCEDURE — 80053 COMPREHEN METABOLIC PANEL: CPT | Performed by: EMERGENCY MEDICINE

## 2021-11-22 PROCEDURE — 93010 ELECTROCARDIOGRAM REPORT: CPT

## 2021-11-22 PROCEDURE — 81001 URINALYSIS AUTO W/SCOPE: CPT | Performed by: EMERGENCY MEDICINE

## 2021-11-22 PROCEDURE — 96374 THER/PROPH/DIAG INJ IV PUSH: CPT

## 2021-11-22 PROCEDURE — 70450 CT HEAD/BRAIN W/O DYE: CPT | Performed by: EMERGENCY MEDICINE

## 2021-11-22 PROCEDURE — 99284 EMERGENCY DEPT VISIT MOD MDM: CPT

## 2021-11-22 PROCEDURE — 93005 ELECTROCARDIOGRAM TRACING: CPT

## 2021-11-22 PROCEDURE — 99285 EMERGENCY DEPT VISIT HI MDM: CPT

## 2021-11-22 RX ORDER — ONDANSETRON 2 MG/ML
4 INJECTION INTRAMUSCULAR; INTRAVENOUS ONCE
Status: COMPLETED | OUTPATIENT
Start: 2021-11-22 | End: 2021-11-22

## 2021-11-22 RX ORDER — POTASSIUM CHLORIDE 20 MEQ/1
40 TABLET, EXTENDED RELEASE ORAL ONCE
Status: COMPLETED | OUTPATIENT
Start: 2021-11-22 | End: 2021-11-23

## 2021-11-23 ENCOUNTER — APPOINTMENT (OUTPATIENT)
Dept: GENERAL RADIOLOGY | Age: 86
End: 2021-11-23
Attending: EMERGENCY MEDICINE
Payer: MEDICARE

## 2021-11-23 ENCOUNTER — HOSPITAL ENCOUNTER (EMERGENCY)
Age: 86
Discharge: HOME | End: 2021-11-23
Attending: EMERGENCY MEDICINE
Payer: MEDICARE

## 2021-11-23 VITALS
OXYGEN SATURATION: 97 % | HEIGHT: 67 IN | DIASTOLIC BLOOD PRESSURE: 83 MMHG | HEART RATE: 63 BPM | WEIGHT: 155 LBS | TEMPERATURE: 98 F | SYSTOLIC BLOOD PRESSURE: 200 MMHG | RESPIRATION RATE: 16 BRPM | BODY MASS INDEX: 24.33 KG/M2

## 2021-11-23 VITALS
WEIGHT: 155 LBS | TEMPERATURE: 98 F | BODY MASS INDEX: 24.33 KG/M2 | OXYGEN SATURATION: 99 % | HEIGHT: 67 IN | HEART RATE: 82 BPM | SYSTOLIC BLOOD PRESSURE: 206 MMHG | DIASTOLIC BLOOD PRESSURE: 82 MMHG | RESPIRATION RATE: 18 BRPM

## 2021-11-23 DIAGNOSIS — R11.2 NON-INTRACTABLE VOMITING WITH NAUSEA, UNSPECIFIED VOMITING TYPE: Primary | ICD-10-CM

## 2021-11-23 PROCEDURE — 99284 EMERGENCY DEPT VISIT MOD MDM: CPT

## 2021-11-23 PROCEDURE — 85025 COMPLETE CBC W/AUTO DIFF WBC: CPT | Performed by: EMERGENCY MEDICINE

## 2021-11-23 PROCEDURE — 96361 HYDRATE IV INFUSION ADD-ON: CPT

## 2021-11-23 PROCEDURE — 80053 COMPREHEN METABOLIC PANEL: CPT | Performed by: EMERGENCY MEDICINE

## 2021-11-23 PROCEDURE — 74019 RADEX ABDOMEN 2 VIEWS: CPT | Performed by: EMERGENCY MEDICINE

## 2021-11-23 PROCEDURE — 83735 ASSAY OF MAGNESIUM: CPT | Performed by: EMERGENCY MEDICINE

## 2021-11-23 PROCEDURE — 96374 THER/PROPH/DIAG INJ IV PUSH: CPT

## 2021-11-23 RX ORDER — ONDANSETRON 4 MG/1
4 TABLET, ORALLY DISINTEGRATING ORAL EVERY 8 HOURS PRN
Qty: 10 TABLET | Refills: 0 | Status: SHIPPED | OUTPATIENT
Start: 2021-11-23 | End: 2021-11-30

## 2021-11-23 RX ORDER — ONDANSETRON 4 MG/1
TABLET, FILM COATED ORAL
Status: DISCONTINUED
Start: 2021-11-23 | End: 2021-11-23

## 2021-11-23 RX ORDER — ONDANSETRON 4 MG/1
4 TABLET, ORALLY DISINTEGRATING ORAL EVERY 4 HOURS PRN
Qty: 10 TABLET | Refills: 0 | Status: SHIPPED | OUTPATIENT
Start: 2021-11-23 | End: 2021-11-30

## 2021-11-23 RX ORDER — ONDANSETRON 2 MG/ML
4 INJECTION INTRAMUSCULAR; INTRAVENOUS ONCE
Status: COMPLETED | OUTPATIENT
Start: 2021-11-23 | End: 2021-11-23

## 2021-11-23 RX ORDER — ONDANSETRON 4 MG/1
4 TABLET, ORALLY DISINTEGRATING ORAL ONCE
Status: COMPLETED | OUTPATIENT
Start: 2021-11-23 | End: 2021-11-23

## 2021-11-23 RX ORDER — ONDANSETRON 4 MG/1
4 TABLET, ORALLY DISINTEGRATING ORAL ONCE
Status: DISCONTINUED | OUTPATIENT
Start: 2021-11-23 | End: 2021-11-23

## 2021-11-23 NOTE — ED QUICK NOTES
Pt tolerating ice chips, sutures removed from left eye brow Samples Given: Rhofade Initiate Treatment: Rhofade qam Detail Level: Zone

## 2021-11-23 NOTE — ED PROVIDER NOTES
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 Nose: Nose normal.      Mouth/Throat:      Mouth: Mucous membranes are moist.   Eyes:      Extraocular Movements: Extraocular movements intact.       Pupils: Pupils are equal, round, and reactive to ligh VIEWS), LEFT (CPT=73030)    Result Date: 11/18/2021  PROCEDURE:  XR SHOULDER, COMPLETE (MIN 2 VIEWS), LEFT (CPT=73030)     TECHNIQUE:  Multiple views were obtained. COMPARISON:  None.   INDICATIONS:  fall with head injury no loc, large hematoma to lt foreh Details as above.      Dictated by (CST): Anel Carrizales MD on 11/22/2021 at 10:19 PM     Finalized by (CST): Anel Carrizales MD on 11/22/2021 at 10:22 PM       CT BRAIN OR HEAD (65366)    Result Date: 11/18/2021  PROCEDURE:  CT BRAIN OR HEAD (16524)  COMPARISON: (CPT=70480)    Result Date: 11/18/2021  PROCEDURE:  CT ORBITS (ZUB=97836)  COMPARISON:  [facility], CT, CT BRAIN OR HEAD ([id number]), 11/18/2021, 5:48 PM.  INDICATIONS:  fall with head injury no loc, large hematoma to lt forehead  TECHNIQUE:  Multi-planar CT i Trauma; Mild/moderate trauma; Spondyloarthropathy  TECHNIQUE:  Noncontrast CT scanning of the cervical spine is performed from the skull base through C7. Multiplanar reconstructions are generated. Dose reduction techniques were used.  Dose information is he has been vomiting and has abdominal pain for the last 2 days. Also stated he has had several surgeries on the right side of his abdomen.              CONCLUSION:    Normal bowel gas pattern, with no excessive gas or stool in the GI tract, nothing specifi unspecified vomiting type  (primary encounter diagnosis)     Disposition:  Discharge  11/23/2021  2:18 pm    Follow-up:  Franki Villarreal, [address] [facility] [phone]    Call in 1 day            Medications Prescribed:  Current

## 2021-11-23 NOTE — ED PROVIDER NOTES
Patient Seen in: [facility] Emergency Department      History   Patient presents with:  HTN    Stated Complaint: HTN     Subjective:   HPI    Patient with a history of coronary disease, hypertension, rheumatoid arthritis, hyperlipidemia, status post Frequent use of laxatives    • Frequent UTI    • Glaucoma    • Heart attack ([facility] [address] 2001, 2011, 2012    x5   • Hemorrhoids    • High blood pressure    • High cholesterol    • History of angioplasty    • History of kidney cancer    • Hypothyroid    • Itch of Triage Vitals [11/22/21 1952]   BP (!) 217/132   Pulse 65   Resp 16   Temp 97.8 °F (36.6 °C)   Temp src Temporal   SpO2 96 %   O2 Device None (Room air)       Current:BP (!) 206/82   Pulse 82   Temp 97.8 °F (36.6 °C) (Temporal)   Resp 18   Ht 170.2 cm (5' Platelet.   Procedure                               Abnormality         Status                     ---------                               -----------         ------                     CBC W/ DIFFERENTIAL[[id number]]          Abnormal            Final resul Disposition and Plan     Clinical Impression:  Non-intractable vomiting with nausea, unspecified vomiting type  (primary encounter diagnosis)  Hypokalemia  Elevated blood pressure reading     Disposition:  Discharge  11/23/2021 12:11 am    Follow-u

## 2021-11-23 NOTE — ED INITIAL ASSESSMENT (HPI)
Pt states feeling nauseated since yesterday, vomited last night  Poor appetite, thinks he needs his stitches removed

## 2023-04-20 ENCOUNTER — APPOINTMENT (OUTPATIENT)
Dept: CT IMAGING | Facility: HOSPITAL | Age: 88
End: 2023-04-20
Attending: STUDENT IN AN ORGANIZED HEALTH CARE EDUCATION/TRAINING PROGRAM
Payer: MEDICARE

## 2023-04-20 ENCOUNTER — HOSPITAL ENCOUNTER (EMERGENCY)
Facility: HOSPITAL | Age: 88
Discharge: HOME | End: 2023-04-20
Attending: STUDENT IN AN ORGANIZED HEALTH CARE EDUCATION/TRAINING PROGRAM
Payer: MEDICARE

## 2023-04-20 VITALS
WEIGHT: 164 LBS | SYSTOLIC BLOOD PRESSURE: 169 MMHG | OXYGEN SATURATION: 95 % | DIASTOLIC BLOOD PRESSURE: 79 MMHG | BODY MASS INDEX: 26 KG/M2 | TEMPERATURE: 98 F | HEART RATE: 62 BPM | RESPIRATION RATE: 18 BRPM

## 2023-04-20 DIAGNOSIS — N18.9 CHRONIC KIDNEY DISEASE, UNSPECIFIED CKD STAGE: Primary | ICD-10-CM

## 2023-04-20 DIAGNOSIS — M54.9 BACK PAIN WITHOUT RADIATION: ICD-10-CM

## 2023-04-20 LAB
ALBUMIN SERPL-MCNC: 3.3 G/DL (ref 3.4–5)
ALBUMIN/GLOB SERPL: 1.1 {RATIO} (ref 1–2)
ALP LIVER SERPL-CCNC: 44 U/L
ALT SERPL-CCNC: 22 U/L
ANION GAP SERPL CALC-SCNC: <0 MMOL/L (ref 0–18)
AST SERPL-CCNC: 21 U/L (ref 15–37)
BASOPHILS # BLD AUTO: 0.04 X10(3) UL (ref 0–0.2)
BASOPHILS NFR BLD AUTO: 0.8 %
BILIRUB SERPL-MCNC: 0.3 MG/DL (ref 0.1–2)
BILIRUB UR QL STRIP.AUTO: NEGATIVE
BUN BLD-MCNC: 19 MG/DL (ref 7–18)
CALCIUM BLD-MCNC: 9.1 MG/DL (ref 8.5–10.1)
CHLORIDE SERPL-SCNC: 110 MMOL/L (ref 98–112)
CLARITY UR REFRACT.AUTO: CLEAR
CO2 SERPL-SCNC: 30 MMOL/L (ref 21–32)
COLOR UR AUTO: YELLOW
CREAT BLD-MCNC: 1.67 MG/DL
EOSINOPHIL # BLD AUTO: 0.05 X10(3) UL (ref 0–0.7)
EOSINOPHIL NFR BLD AUTO: 1 %
ERYTHROCYTE [DISTWIDTH] IN BLOOD BY AUTOMATED COUNT: 13.4 %
GFR SERPLBLD BASED ON 1.73 SQ M-ARVRAT: 40 ML/MIN/1.73M2 (ref 60–?)
GLOBULIN PLAS-MCNC: 3.1 G/DL (ref 2.8–4.4)
GLUCOSE BLD-MCNC: 98 MG/DL (ref 70–99)
GLUCOSE UR STRIP.AUTO-MCNC: NEGATIVE MG/DL
HCT VFR BLD AUTO: 37.8 %
HGB BLD-MCNC: 12.6 G/DL
IMM GRANULOCYTES # BLD AUTO: 0.02 X10(3) UL (ref 0–1)
IMM GRANULOCYTES NFR BLD: 0.4 %
KETONES UR STRIP.AUTO-MCNC: NEGATIVE MG/DL
LEUKOCYTE ESTERASE UR QL STRIP.AUTO: NEGATIVE
LYMPHOCYTES # BLD AUTO: 0.84 X10(3) UL (ref 1–4)
LYMPHOCYTES NFR BLD AUTO: 16.7 %
MCH RBC QN AUTO: 32.9 PG (ref 26–34)
MCHC RBC AUTO-ENTMCNC: 33.3 G/DL (ref 31–37)
MCV RBC AUTO: 98.7 FL
MONOCYTES # BLD AUTO: 0.45 X10(3) UL (ref 0.1–1)
MONOCYTES NFR BLD AUTO: 8.9 %
NEUTROPHILS # BLD AUTO: 3.63 X10 (3) UL (ref 1.5–7.7)
NEUTROPHILS # BLD AUTO: 3.63 X10(3) UL (ref 1.5–7.7)
NEUTROPHILS NFR BLD AUTO: 72.2 %
NITRITE UR QL STRIP.AUTO: NEGATIVE
OSMOLALITY SERPL CALC.SUM OF ELEC: 290 MOSM/KG (ref 275–295)
PH UR STRIP.AUTO: 5 [PH] (ref 5–8)
PLATELET # BLD AUTO: 178 10(3)UL (ref 150–450)
POTASSIUM SERPL-SCNC: 4.5 MMOL/L (ref 3.5–5.1)
PROT SERPL-MCNC: 6.4 G/DL (ref 6.4–8.2)
PROT UR STRIP.AUTO-MCNC: NEGATIVE MG/DL
RBC # BLD AUTO: 3.83 X10(6)UL
RBC UR QL AUTO: NEGATIVE
SODIUM SERPL-SCNC: 139 MMOL/L (ref 136–145)
SP GR UR STRIP.AUTO: 1.02 (ref 1–1.03)
UROBILINOGEN UR STRIP.AUTO-MCNC: <2 MG/DL
WBC # BLD AUTO: 5 X10(3) UL (ref 4–11)

## 2023-04-20 PROCEDURE — 81001 URINALYSIS AUTO W/SCOPE: CPT | Performed by: STUDENT IN AN ORGANIZED HEALTH CARE EDUCATION/TRAINING PROGRAM

## 2023-04-20 PROCEDURE — 74176 CT ABD & PELVIS W/O CONTRAST: CPT | Performed by: STUDENT IN AN ORGANIZED HEALTH CARE EDUCATION/TRAINING PROGRAM

## 2023-04-20 PROCEDURE — 99285 EMERGENCY DEPT VISIT HI MDM: CPT

## 2023-04-20 PROCEDURE — 80053 COMPREHEN METABOLIC PANEL: CPT | Performed by: STUDENT IN AN ORGANIZED HEALTH CARE EDUCATION/TRAINING PROGRAM

## 2023-04-20 PROCEDURE — 99284 EMERGENCY DEPT VISIT MOD MDM: CPT

## 2023-04-20 PROCEDURE — 85025 COMPLETE CBC W/AUTO DIFF WBC: CPT | Performed by: STUDENT IN AN ORGANIZED HEALTH CARE EDUCATION/TRAINING PROGRAM

## 2023-04-20 PROCEDURE — 96375 TX/PRO/DX INJ NEW DRUG ADDON: CPT

## 2023-04-20 PROCEDURE — 96361 HYDRATE IV INFUSION ADD-ON: CPT

## 2023-04-20 PROCEDURE — 96374 THER/PROPH/DIAG INJ IV PUSH: CPT

## 2023-04-20 RX ORDER — SODIUM CHLORIDE 9 MG/ML
1000 INJECTION, SOLUTION INTRAVENOUS ONCE
Status: COMPLETED | OUTPATIENT
Start: 2023-04-20 | End: 2023-04-20

## 2023-04-20 RX ORDER — ONDANSETRON 2 MG/ML
4 INJECTION INTRAMUSCULAR; INTRAVENOUS ONCE
Status: COMPLETED | OUTPATIENT
Start: 2023-04-20 | End: 2023-04-20

## 2023-04-20 NOTE — ED INITIAL ASSESSMENT (HPI)
Left flank pain started 2-3 days ago. Burning, frequency, urgency with urination. Only has left kidney, right kidney removed years ago. No history of kidney stones.

## 2023-11-19 ENCOUNTER — APPOINTMENT (OUTPATIENT)
Dept: GENERAL RADIOLOGY | Age: 88
DRG: 291 | End: 2023-11-19
Attending: STUDENT IN AN ORGANIZED HEALTH CARE EDUCATION/TRAINING PROGRAM
Payer: MEDICARE

## 2023-11-19 ENCOUNTER — APPOINTMENT (OUTPATIENT)
Dept: ULTRASOUND IMAGING | Age: 88
End: 2023-11-19
Attending: STUDENT IN AN ORGANIZED HEALTH CARE EDUCATION/TRAINING PROGRAM
Payer: MEDICARE

## 2023-11-19 ENCOUNTER — APPOINTMENT (OUTPATIENT)
Dept: ULTRASOUND IMAGING | Age: 88
DRG: 291 | End: 2023-11-19
Attending: STUDENT IN AN ORGANIZED HEALTH CARE EDUCATION/TRAINING PROGRAM
Payer: MEDICARE

## 2023-11-19 ENCOUNTER — APPOINTMENT (OUTPATIENT)
Dept: GENERAL RADIOLOGY | Age: 88
End: 2023-11-19
Attending: STUDENT IN AN ORGANIZED HEALTH CARE EDUCATION/TRAINING PROGRAM
Payer: MEDICARE

## 2023-11-19 ENCOUNTER — HOSPITAL ENCOUNTER (INPATIENT)
Facility: HOSPITAL | Age: 88
LOS: 1 days | Discharge: HOME HEALTH | End: 2023-11-21
Attending: STUDENT IN AN ORGANIZED HEALTH CARE EDUCATION/TRAINING PROGRAM | Admitting: HOSPITALIST
Payer: MEDICARE

## 2023-11-19 ENCOUNTER — HOSPITAL ENCOUNTER (INPATIENT)
Facility: HOSPITAL | Age: 88
LOS: 1 days | Discharge: HOME | DRG: 291 | End: 2023-11-21
Attending: STUDENT IN AN ORGANIZED HEALTH CARE EDUCATION/TRAINING PROGRAM | Admitting: HOSPITALIST
Payer: MEDICARE

## 2023-11-19 DIAGNOSIS — I50.9 ACUTE ON CHRONIC CONGESTIVE HEART FAILURE, UNSPECIFIED HEART FAILURE TYPE (HCC): Primary | ICD-10-CM

## 2023-11-19 DIAGNOSIS — N18.9 CHRONIC KIDNEY DISEASE, UNSPECIFIED CKD STAGE: ICD-10-CM

## 2023-11-19 DIAGNOSIS — N17.9 AKI (ACUTE KIDNEY INJURY) (HCC): ICD-10-CM

## 2023-11-19 DIAGNOSIS — R42 POSTURAL DIZZINESS WITH PRESYNCOPE: ICD-10-CM

## 2023-11-19 DIAGNOSIS — R55 POSTURAL DIZZINESS WITH PRESYNCOPE: ICD-10-CM

## 2023-11-19 LAB
ANION GAP SERPL CALC-SCNC: 4 MMOL/L (ref 0–18)
BASOPHILS # BLD AUTO: 0.03 X10(3) UL (ref 0–0.2)
BASOPHILS NFR BLD AUTO: 0.4 %
BUN BLD-MCNC: 25 MG/DL (ref 9–23)
CALCIUM BLD-MCNC: 9 MG/DL (ref 8.5–10.1)
CHLORIDE SERPL-SCNC: 109 MMOL/L (ref 98–112)
CO2 SERPL-SCNC: 27 MMOL/L (ref 21–32)
CREAT BLD-MCNC: 2.2 MG/DL
EGFRCR SERPLBLD CKD-EPI 2021: 28 ML/MIN/1.73M2 (ref 60–?)
EOSINOPHIL # BLD AUTO: 0.14 X10(3) UL (ref 0–0.7)
EOSINOPHIL NFR BLD AUTO: 1.7 %
ERYTHROCYTE [DISTWIDTH] IN BLOOD BY AUTOMATED COUNT: 13.7 %
GLUCOSE BLD-MCNC: 137 MG/DL (ref 70–99)
HCT VFR BLD AUTO: 36.6 %
HGB BLD-MCNC: 12.6 G/DL
IMM GRANULOCYTES # BLD AUTO: 0.02 X10(3) UL (ref 0–1)
IMM GRANULOCYTES NFR BLD: 0.2 %
LYMPHOCYTES # BLD AUTO: 1.09 X10(3) UL (ref 1–4)
LYMPHOCYTES NFR BLD AUTO: 13.6 %
MCH RBC QN AUTO: 34.3 PG (ref 26–34)
MCHC RBC AUTO-ENTMCNC: 34.4 G/DL (ref 31–37)
MCV RBC AUTO: 99.7 FL
MONOCYTES # BLD AUTO: 0.66 X10(3) UL (ref 0.1–1)
MONOCYTES NFR BLD AUTO: 8.2 %
NEUTROPHILS # BLD AUTO: 6.09 X10 (3) UL (ref 1.5–7.7)
NEUTROPHILS # BLD AUTO: 6.09 X10(3) UL (ref 1.5–7.7)
NEUTROPHILS NFR BLD AUTO: 75.9 %
NT-PROBNP SERPL-MCNC: 493 PG/ML (ref ?–450)
OSMOLALITY SERPL CALC.SUM OF ELEC: 297 MOSM/KG (ref 275–295)
PLATELET # BLD AUTO: 153 10(3)UL (ref 150–450)
POTASSIUM SERPL-SCNC: 3.5 MMOL/L (ref 3.5–5.1)
RBC # BLD AUTO: 3.67 X10(6)UL
SODIUM SERPL-SCNC: 140 MMOL/L (ref 136–145)
TROPONIN I SERPL HS-MCNC: 17 NG/L
WBC # BLD AUTO: 8 X10(3) UL (ref 4–11)

## 2023-11-19 PROCEDURE — 71045 X-RAY EXAM CHEST 1 VIEW: CPT | Performed by: STUDENT IN AN ORGANIZED HEALTH CARE EDUCATION/TRAINING PROGRAM

## 2023-11-19 PROCEDURE — 73562 X-RAY EXAM OF KNEE 3: CPT | Performed by: STUDENT IN AN ORGANIZED HEALTH CARE EDUCATION/TRAINING PROGRAM

## 2023-11-19 PROCEDURE — 93971 EXTREMITY STUDY: CPT | Performed by: STUDENT IN AN ORGANIZED HEALTH CARE EDUCATION/TRAINING PROGRAM

## 2023-11-19 RX ORDER — TAMSULOSIN HYDROCHLORIDE 0.4 MG/1
0.4 CAPSULE ORAL NIGHTLY
COMMUNITY

## 2023-11-19 RX ORDER — MORPHINE SULFATE 4 MG/ML
2 INJECTION, SOLUTION INTRAMUSCULAR; INTRAVENOUS ONCE
Status: COMPLETED | OUTPATIENT
Start: 2023-11-19 | End: 2023-11-19

## 2023-11-19 RX ORDER — SENNOSIDES 8.6 MG
8.6 TABLET ORAL AS NEEDED
COMMUNITY
End: 2024-04-05

## 2023-11-19 RX ORDER — ONDANSETRON 2 MG/ML
4 INJECTION INTRAMUSCULAR; INTRAVENOUS ONCE
Status: COMPLETED | OUTPATIENT
Start: 2023-11-19 | End: 2023-11-19

## 2023-11-19 RX ORDER — OMEPRAZOLE 20 MG/1
20 CAPSULE, DELAYED RELEASE ORAL
Status: ON HOLD | COMMUNITY
End: 2024-04-05

## 2023-11-20 ENCOUNTER — APPOINTMENT (OUTPATIENT)
Dept: CV DIAGNOSTICS | Facility: HOSPITAL | Age: 88
DRG: 291 | End: 2023-11-20
Attending: INTERNAL MEDICINE
Payer: MEDICARE

## 2023-11-20 ENCOUNTER — APPOINTMENT (OUTPATIENT)
Dept: CV DIAGNOSTICS | Facility: HOSPITAL | Age: 88
End: 2023-11-20
Attending: NURSE PRACTITIONER
Payer: MEDICARE

## 2023-11-20 ENCOUNTER — APPOINTMENT (OUTPATIENT)
Dept: CV DIAGNOSTICS | Facility: HOSPITAL | Age: 88
DRG: 291 | End: 2023-11-20
Attending: NURSE PRACTITIONER
Payer: MEDICARE

## 2023-11-20 ENCOUNTER — APPOINTMENT (OUTPATIENT)
Dept: CV DIAGNOSTICS | Facility: HOSPITAL | Age: 88
End: 2023-11-20
Attending: INTERNAL MEDICINE
Payer: MEDICARE

## 2023-11-20 PROBLEM — N18.9 CHRONIC KIDNEY DISEASE, UNSPECIFIED CKD STAGE: Status: ACTIVE | Noted: 2023-11-20

## 2023-11-20 PROBLEM — N17.9 AKI (ACUTE KIDNEY INJURY) (HCC): Status: ACTIVE | Noted: 2023-11-20

## 2023-11-20 PROBLEM — R42 POSTURAL DIZZINESS WITH PRESYNCOPE: Status: ACTIVE | Noted: 2023-11-20

## 2023-11-20 PROBLEM — R55 POSTURAL DIZZINESS WITH PRESYNCOPE: Status: ACTIVE | Noted: 2023-11-20

## 2023-11-20 PROBLEM — N17.9 AKI (ACUTE KIDNEY INJURY): Status: ACTIVE | Noted: 2023-11-20

## 2023-11-20 PROBLEM — I50.32 CHRONIC HEART FAILURE WITH PRESERVED EJECTION FRACTION (HCC): Status: ACTIVE | Noted: 2023-11-20

## 2023-11-20 LAB
ANION GAP SERPL CALC-SCNC: 5 MMOL/L (ref 0–18)
ATRIAL RATE: 72 BPM
BASOPHILS # BLD AUTO: 0.04 X10(3) UL (ref 0–0.2)
BASOPHILS NFR BLD AUTO: 0.7 %
BILIRUB UR QL STRIP.AUTO: NEGATIVE
BUN BLD-MCNC: 20 MG/DL (ref 9–23)
CALCIUM BLD-MCNC: 8.6 MG/DL (ref 8.5–10.1)
CHLORIDE SERPL-SCNC: 110 MMOL/L (ref 98–112)
CLARITY UR REFRACT.AUTO: CLEAR
CO2 SERPL-SCNC: 26 MMOL/L (ref 21–32)
CREAT BLD-MCNC: 1.89 MG/DL
EGFRCR SERPLBLD CKD-EPI 2021: 34 ML/MIN/1.73M2 (ref 60–?)
EOSINOPHIL # BLD AUTO: 0.19 X10(3) UL (ref 0–0.7)
EOSINOPHIL NFR BLD AUTO: 3.4 %
ERYTHROCYTE [DISTWIDTH] IN BLOOD BY AUTOMATED COUNT: 13.5 %
GLUCOSE BLD-MCNC: 111 MG/DL (ref 70–99)
GLUCOSE UR STRIP.AUTO-MCNC: NORMAL MG/DL
HCT VFR BLD AUTO: 37.2 %
HGB BLD-MCNC: 12.6 G/DL
IMM GRANULOCYTES # BLD AUTO: 0.01 X10(3) UL (ref 0–1)
IMM GRANULOCYTES NFR BLD: 0.2 %
KETONES UR STRIP.AUTO-MCNC: NEGATIVE MG/DL
LEUKOCYTE ESTERASE UR QL STRIP.AUTO: NEGATIVE
LYMPHOCYTES # BLD AUTO: 1.04 X10(3) UL (ref 1–4)
LYMPHOCYTES NFR BLD AUTO: 18.6 %
MCH RBC QN AUTO: 33.5 PG (ref 26–34)
MCHC RBC AUTO-ENTMCNC: 33.9 G/DL (ref 31–37)
MCV RBC AUTO: 98.9 FL
MONOCYTES # BLD AUTO: 0.61 X10(3) UL (ref 0.1–1)
MONOCYTES NFR BLD AUTO: 10.9 %
NEUTROPHILS # BLD AUTO: 3.69 X10 (3) UL (ref 1.5–7.7)
NEUTROPHILS # BLD AUTO: 3.69 X10(3) UL (ref 1.5–7.7)
NEUTROPHILS NFR BLD AUTO: 66.2 %
NITRITE UR QL STRIP.AUTO: NEGATIVE
OSMOLALITY SERPL CALC.SUM OF ELEC: 295 MOSM/KG (ref 275–295)
P AXIS: 57 DEGREES
P-R INTERVAL: 146 MS
PH UR STRIP.AUTO: 6.5 [PH] (ref 5–8)
PLATELET # BLD AUTO: 141 10(3)UL (ref 150–450)
POTASSIUM SERPL-SCNC: 3.5 MMOL/L (ref 3.5–5.1)
POTASSIUM SERPL-SCNC: 4.3 MMOL/L (ref 3.5–5.1)
Q-T INTERVAL: 448 MS
QRS DURATION: 98 MS
QTC CALCULATION (BEZET): 490 MS
R AXIS: -60 DEGREES
RBC # BLD AUTO: 3.76 X10(6)UL
SODIUM SERPL-SCNC: 141 MMOL/L (ref 136–145)
SP GR UR STRIP.AUTO: 1.01 (ref 1–1.03)
T AXIS: 41 DEGREES
TROPONIN I SERPL HS-MCNC: 18 NG/L
UROBILINOGEN UR STRIP.AUTO-MCNC: NORMAL MG/DL
VENTRICULAR RATE: 72 BPM
WBC # BLD AUTO: 5.6 X10(3) UL (ref 4–11)

## 2023-11-20 PROCEDURE — 78452 HT MUSCLE IMAGE SPECT MULT: CPT | Performed by: INTERNAL MEDICINE

## 2023-11-20 PROCEDURE — 99232 SBSQ HOSP IP/OBS MODERATE 35: CPT | Performed by: STUDENT IN AN ORGANIZED HEALTH CARE EDUCATION/TRAINING PROGRAM

## 2023-11-20 PROCEDURE — 93018 CV STRESS TEST I&R ONLY: CPT | Performed by: INTERNAL MEDICINE

## 2023-11-20 PROCEDURE — 99223 1ST HOSP IP/OBS HIGH 75: CPT | Performed by: INTERNAL MEDICINE

## 2023-11-20 PROCEDURE — 93306 TTE W/DOPPLER COMPLETE: CPT | Performed by: NURSE PRACTITIONER

## 2023-11-20 PROCEDURE — 99223 1ST HOSP IP/OBS HIGH 75: CPT | Performed by: HOSPITALIST

## 2023-11-20 PROCEDURE — 93017 CV STRESS TEST TRACING ONLY: CPT | Performed by: INTERNAL MEDICINE

## 2023-11-20 RX ORDER — METOPROLOL SUCCINATE 25 MG/1
25 TABLET, EXTENDED RELEASE ORAL
Status: DISCONTINUED | OUTPATIENT
Start: 2023-11-20 | End: 2023-11-21

## 2023-11-20 RX ORDER — HYDROCODONE BITARTRATE AND ACETAMINOPHEN 5; 325 MG/1; MG/1
1 TABLET ORAL EVERY 6 HOURS PRN
Status: DISCONTINUED | OUTPATIENT
Start: 2023-11-20 | End: 2023-11-21

## 2023-11-20 RX ORDER — LEVOTHYROXINE SODIUM 0.03 MG/1
25 TABLET ORAL
Status: DISCONTINUED | OUTPATIENT
Start: 2023-11-20 | End: 2023-11-20

## 2023-11-20 RX ORDER — ROSUVASTATIN CALCIUM 20 MG/1
20 TABLET, COATED ORAL NIGHTLY
Status: DISCONTINUED | OUTPATIENT
Start: 2023-11-20 | End: 2023-11-21

## 2023-11-20 RX ORDER — EZETIMIBE 10 MG/1
10 TABLET ORAL NIGHTLY
Status: DISCONTINUED | OUTPATIENT
Start: 2023-11-20 | End: 2023-11-21

## 2023-11-20 RX ORDER — LEVOTHYROXINE SODIUM 88 UG/1
88 TABLET ORAL
Status: DISCONTINUED | OUTPATIENT
Start: 2023-11-20 | End: 2023-11-21

## 2023-11-20 RX ORDER — TAMSULOSIN HYDROCHLORIDE 0.4 MG/1
0.4 CAPSULE ORAL NIGHTLY
Status: DISCONTINUED | OUTPATIENT
Start: 2023-11-20 | End: 2023-11-21

## 2023-11-20 RX ORDER — TRAZODONE HYDROCHLORIDE 50 MG/1
50 TABLET ORAL NIGHTLY
Status: DISCONTINUED | OUTPATIENT
Start: 2023-11-20 | End: 2023-11-21

## 2023-11-20 RX ORDER — ONDANSETRON 2 MG/ML
4 INJECTION INTRAMUSCULAR; INTRAVENOUS EVERY 6 HOURS PRN
Status: DISCONTINUED | OUTPATIENT
Start: 2023-11-20 | End: 2023-11-21

## 2023-11-20 RX ORDER — ABIRATERONE ACETATE 250 MG/1
1000 TABLET ORAL DAILY
Status: DISCONTINUED | OUTPATIENT
Start: 2023-11-20 | End: 2023-11-21

## 2023-11-20 RX ORDER — ACETAMINOPHEN 500 MG
500 TABLET ORAL EVERY 4 HOURS PRN
Status: DISCONTINUED | OUTPATIENT
Start: 2023-11-20 | End: 2023-11-21

## 2023-11-20 RX ORDER — SENNOSIDES 8.6 MG
17.2 TABLET ORAL NIGHTLY PRN
Status: DISCONTINUED | OUTPATIENT
Start: 2023-11-20 | End: 2023-11-21

## 2023-11-20 RX ORDER — FUROSEMIDE 10 MG/ML
40 INJECTION INTRAMUSCULAR; INTRAVENOUS ONCE
Status: COMPLETED | OUTPATIENT
Start: 2023-11-20 | End: 2023-11-20

## 2023-11-20 RX ORDER — LEVOTHYROXINE SODIUM 88 UG/1
88 TABLET ORAL
COMMUNITY

## 2023-11-20 RX ORDER — BISACODYL 10 MG
10 SUPPOSITORY, RECTAL RECTAL
Status: DISCONTINUED | OUTPATIENT
Start: 2023-11-20 | End: 2023-11-21

## 2023-11-20 RX ORDER — CLOPIDOGREL BISULFATE 75 MG/1
75 TABLET ORAL DAILY
Status: DISCONTINUED | OUTPATIENT
Start: 2023-11-20 | End: 2023-11-21

## 2023-11-20 RX ORDER — PANTOPRAZOLE SODIUM 20 MG/1
20 TABLET, DELAYED RELEASE ORAL
Status: DISCONTINUED | OUTPATIENT
Start: 2023-11-20 | End: 2023-11-21

## 2023-11-20 RX ORDER — POLYETHYLENE GLYCOL 3350 17 G/17G
17 POWDER, FOR SOLUTION ORAL DAILY PRN
Status: DISCONTINUED | OUTPATIENT
Start: 2023-11-20 | End: 2023-11-21

## 2023-11-20 RX ORDER — HEPARIN SODIUM 5000 [USP'U]/ML
5000 INJECTION, SOLUTION INTRAVENOUS; SUBCUTANEOUS EVERY 8 HOURS SCHEDULED
Status: DISCONTINUED | OUTPATIENT
Start: 2023-11-20 | End: 2023-11-21

## 2023-11-20 RX ORDER — PREDNISONE 5 MG/1
5 TABLET ORAL DAILY
Status: DISCONTINUED | OUTPATIENT
Start: 2023-11-20 | End: 2023-11-21

## 2023-11-20 RX ORDER — SIMETHICONE 80 MG
80 TABLET,CHEWABLE ORAL 4 TIMES DAILY PRN
Status: DISCONTINUED | OUTPATIENT
Start: 2023-11-20 | End: 2023-11-21

## 2023-11-20 RX ORDER — MELATONIN
3 NIGHTLY PRN
Status: DISCONTINUED | OUTPATIENT
Start: 2023-11-20 | End: 2023-11-21

## 2023-11-20 RX ORDER — METOCLOPRAMIDE HYDROCHLORIDE 5 MG/ML
5 INJECTION INTRAMUSCULAR; INTRAVENOUS EVERY 8 HOURS PRN
Status: DISCONTINUED | OUTPATIENT
Start: 2023-11-20 | End: 2023-11-21

## 2023-11-20 RX ORDER — BRINZOLAMIDE 10 MG/ML
1 SUSPENSION/ DROPS OPHTHALMIC 2 TIMES DAILY
Status: DISCONTINUED | OUTPATIENT
Start: 2023-11-20 | End: 2023-11-21

## 2023-11-20 RX ORDER — ASPIRIN 81 MG/1
81 TABLET, CHEWABLE ORAL EVERY OTHER DAY
Status: DISCONTINUED | OUTPATIENT
Start: 2023-11-20 | End: 2023-11-21

## 2023-11-20 RX ORDER — ESCITALOPRAM OXALATE 20 MG/1
20 TABLET ORAL DAILY
Status: DISCONTINUED | OUTPATIENT
Start: 2023-11-20 | End: 2023-11-21

## 2023-11-20 RX ORDER — REGADENOSON 0.08 MG/ML
INJECTION, SOLUTION INTRAVENOUS
Status: COMPLETED
Start: 2023-11-20 | End: 2023-11-20

## 2023-11-20 RX ORDER — DORZOLAMIDE HCL 20 MG/ML
1 SOLUTION/ DROPS OPHTHALMIC 2 TIMES DAILY
Status: DISCONTINUED | OUTPATIENT
Start: 2023-11-20 | End: 2023-11-20

## 2023-11-20 RX ORDER — HYDRALAZINE HYDROCHLORIDE 20 MG/ML
10 INJECTION INTRAMUSCULAR; INTRAVENOUS EVERY 6 HOURS PRN
Status: DISCONTINUED | OUTPATIENT
Start: 2023-11-20 | End: 2023-11-21

## 2023-11-20 RX ORDER — POTASSIUM CHLORIDE 20 MEQ/1
40 TABLET, EXTENDED RELEASE ORAL EVERY 4 HOURS
Status: COMPLETED | OUTPATIENT
Start: 2023-11-20 | End: 2023-11-20

## 2023-11-20 RX ORDER — FLUTICASONE PROPIONATE 50 MCG
2 SPRAY, SUSPENSION (ML) NASAL DAILY
Status: DISCONTINUED | OUTPATIENT
Start: 2023-11-20 | End: 2023-11-21

## 2023-11-20 NOTE — PROGRESS NOTES
Preliminary stress test  normal. No regional wall motion abnormalities  LVEF 64%  Per rounding cardiologist, Pt may discharge with close outpatient follow up

## 2023-11-20 NOTE — PLAN OF CARE
Received patient at 0730. Alert and Oriented x4. Tele Rhythm NSR with occasional PVC. O2 saturation 96% On room air. Breath sounds clear. Bed is locked and in low position. Call light and personal items within reach. No C/O chest pain or shortness of breath. Pt voiding with no issue per urinal.  Pt up with SBA. Plan for echo and stress test today. Wife to bring in POLST form and home medications. C/O 10/10 right knee pain, some swelling noted, Dr Aparna Fong notified, will try Norco, PT/OT to see. Reviewed plan of care and patient verbalizes understanding. Problem: Patient/Family Goals  Goal: Patient/Family Long Term Goal  Description: Patient's Long Term Goal: to go home    Interventions:  - echo, stress test, pain control, tele monitoring, doppler, labs    - See additional Care Plan goals for specific interventions  Outcome: Progressing  Goal: Patient/Family Short Term Goal  Description: Patient's Short Term Goal: feel better     Interventions:   - - echo, stress test, pain control, tele monitoring, doppler, labs    - See additional Care Plan goals for specific interventions  Outcome: Progressing     Problem: CARDIOVASCULAR - ADULT  Goal: Maintains optimal cardiac output and hemodynamic stability  Description: INTERVENTIONS:  - Monitor vital signs, rhythm, and trends  - Monitor for bleeding, hypotension and signs of decreased cardiac output  - Evaluate effectiveness of vasoactive medications to optimize hemodynamic stability  - Monitor arterial and/or venous puncture sites for bleeding and/or hematoma  - Assess quality of pulses, skin color and temperature  - Assess for signs of decreased coronary artery perfusion - ex.  Angina  - Evaluate fluid balance, assess for edema, trend weights  Outcome: Progressing  Goal: Absence of cardiac arrhythmias or at baseline  Description: INTERVENTIONS:  - Continuous cardiac monitoring, monitor vital signs, obtain 12 lead EKG if indicated  - Evaluate effectiveness of antiarrhythmic and heart rate control medications as ordered  - Initiate emergency measures for life threatening arrhythmias  - Monitor electrolytes and administer replacement therapy as ordered  Outcome: Progressing     Problem: PAIN - ADULT  Goal: Verbalizes/displays adequate comfort level or patient's stated pain goal  Description: INTERVENTIONS:  - Encourage pt to monitor pain and request assistance  - Assess pain using appropriate pain scale  - Administer analgesics based on type and severity of pain and evaluate response  - Implement non-pharmacological measures as appropriate and evaluate response  - Consider cultural and social influences on pain and pain management  - Manage/alleviate anxiety  - Utilize distraction and/or relaxation techniques  - Monitor for opioid side effects  - Notify MD/LIP if interventions unsuccessful or patient reports new pain  - Anticipate increased pain with activity and pre-medicate as appropriate  Outcome: Progressing

## 2023-11-20 NOTE — ED QUICK NOTES
Pt now c/o chest pressure following the morphine administration. Dr Malinda Townsend notified. New EKG ordered. Staff explained to pt that the pressure he is feeling might be related to the morphine he just received.

## 2023-11-20 NOTE — PROGRESS NOTES
CARDIODIAGNOSTIC PRELIMINARY REPORT:  Sher Arreolaar completed, tolerated well    Second set of images pending

## 2023-11-20 NOTE — PLAN OF CARE
NURSING ADMISSION NOTE      Patient admitted via Ambulance  Oriented to room. Safety precautions initiated. Bed in low position. Call light in reach. Assumed care of patient at 0100--admitted from [place] ER. Alert and oriented x4, SPO2 on %--SOB at times when talking, Heart rate SR 70s. Breath sounds clear. Bed is locked and in low position. Admission and med rec completed. Admission orders received. x1 dose of IV lasix given per orders. Personal belonging within reach. Updated on plan of care and verbalized understanding. No concerns voiced or noted at this time.

## 2023-11-20 NOTE — ED INITIAL ASSESSMENT (HPI)
Pt states he came in due to bilateral calf pain and dizziness. Pt states today when he went to [facility] someone had to help him get into the car because he was dizzy. Pt states he always has dizziness but states today was worse then his normal. Pt reports mild chest pain and SOB.

## 2023-11-20 NOTE — CONSULTS
Patient Name: Lita eJrry 88 yrs  YOB: 1935  Medical Record Number: [id number]  CSN: [id number]  Date of Admission: 11/19/2023  Attending Physician: Dr. Naren Miles  Reason for Consult: Abiraterone continuation during admission      The [facility] makes medical notes like these available to patients in the interest of transparency. Please be advised this is a medical document. Medical documents are intended to carry relevant information, facts as evident, and the clinical opinion of the practitioner. The medical note is intended as peer to peer communication and may appear blunt or direct. It is written in medical language and may contain abbreviations or verbiage that are unfamiliar. Review of patient's chart, ongoing cardiac work up for chest pain, please hold abiraterone until cleared by cardiology. Please do not hesitate to reach out with any additional questions or concerns.     Electronically Signed by:    Neymar Ritchie DNP, AGPCNP-BC  Nurse Practitioner  Hematology and Oncology

## 2023-11-21 VITALS
HEIGHT: 66 IN | WEIGHT: 159.81 LBS | SYSTOLIC BLOOD PRESSURE: 116 MMHG | OXYGEN SATURATION: 93 % | BODY MASS INDEX: 25.68 KG/M2 | TEMPERATURE: 98 F | DIASTOLIC BLOOD PRESSURE: 66 MMHG | RESPIRATION RATE: 18 BRPM | HEART RATE: 66 BPM

## 2023-11-21 LAB
ANION GAP SERPL CALC-SCNC: 5 MMOL/L (ref 0–18)
BUN BLD-MCNC: 21 MG/DL (ref 9–23)
CALCIUM BLD-MCNC: 8.7 MG/DL (ref 8.5–10.1)
CHLORIDE SERPL-SCNC: 112 MMOL/L (ref 98–112)
CO2 SERPL-SCNC: 27 MMOL/L (ref 21–32)
CREAT BLD-MCNC: 1.89 MG/DL
EGFRCR SERPLBLD CKD-EPI 2021: 34 ML/MIN/1.73M2 (ref 60–?)
GLUCOSE BLD-MCNC: 97 MG/DL (ref 70–99)
OSMOLALITY SERPL CALC.SUM OF ELEC: 301 MOSM/KG (ref 275–295)
POTASSIUM SERPL-SCNC: 4.6 MMOL/L (ref 3.5–5.1)
SODIUM SERPL-SCNC: 144 MMOL/L (ref 136–145)

## 2023-11-21 PROCEDURE — 99233 SBSQ HOSP IP/OBS HIGH 50: CPT | Performed by: INTERNAL MEDICINE

## 2023-11-21 PROCEDURE — 99239 HOSP IP/OBS DSCHRG MGMT >30: CPT | Performed by: STUDENT IN AN ORGANIZED HEALTH CARE EDUCATION/TRAINING PROGRAM

## 2023-11-21 RX ORDER — HYDROCODONE BITARTRATE AND ACETAMINOPHEN 5; 325 MG/1; MG/1
1-2 TABLET ORAL EVERY 6 HOURS PRN
Qty: 15 TABLET | Refills: 0 | Status: SHIPPED | OUTPATIENT
Start: 2023-11-21

## 2023-11-21 NOTE — PLAN OF CARE
Received patient at 0730. Alert and Oriented x4. Tele Rhythm NSR with occasional PVC. O2 saturation 95% On room air. Breath sounds clear. Bed is locked and in low position. Call light and personal items within reach. No C/O chest pain or shortness of breath. Pt voiding per urinal this am. Pt c/o right knee pain which is improving, Norco given with relief. PT/OT to see. Up with assist but hard to ambulate due to pain. Reviewed plan of care and patient verbalizes understanding. Problem: Patient/Family Goals  Goal: Patient/Family Long Term Goal  Description: Patient's Long Term Goal: to go home    Interventions:  - echo, stress test, pain control, tele monitoring, doppler, labs    - See additional Care Plan goals for specific interventions  Outcome: Progressing  Goal: Patient/Family Short Term Goal  Description: Patient's Short Term Goal: feel better     Interventions:   - - echo, stress test, pain control, tele monitoring, doppler, labs    - See additional Care Plan goals for specific interventions  Outcome: Progressing     Problem: CARDIOVASCULAR - ADULT  Goal: Maintains optimal cardiac output and hemodynamic stability  Description: INTERVENTIONS:  - Monitor vital signs, rhythm, and trends  - Monitor for bleeding, hypotension and signs of decreased cardiac output  - Evaluate effectiveness of vasoactive medications to optimize hemodynamic stability  - Monitor arterial and/or venous puncture sites for bleeding and/or hematoma  - Assess quality of pulses, skin color and temperature  - Assess for signs of decreased coronary artery perfusion - ex.  Angina  - Evaluate fluid balance, assess for edema, trend weights  Outcome: Progressing  Goal: Absence of cardiac arrhythmias or at baseline  Description: INTERVENTIONS:  - Continuous cardiac monitoring, monitor vital signs, obtain 12 lead EKG if indicated  - Evaluate effectiveness of antiarrhythmic and heart rate control medications as ordered  - Initiate emergency measures for life threatening arrhythmias  - Monitor electrolytes and administer replacement therapy as ordered  Outcome: Progressing     Problem: PAIN - ADULT  Goal: Verbalizes/displays adequate comfort level or patient's stated pain goal  Description: INTERVENTIONS:  - Encourage pt to monitor pain and request assistance  - Assess pain using appropriate pain scale  - Administer analgesics based on type and severity of pain and evaluate response  - Implement non-pharmacological measures as appropriate and evaluate response  - Consider cultural and social influences on pain and pain management  - Manage/alleviate anxiety  - Utilize distraction and/or relaxation techniques  - Monitor for opioid side effects  - Notify MD/LIP if interventions unsuccessful or patient reports new pain  - Anticipate increased pain with activity and pre-medicate as appropriate  Outcome: Progressing     Problem: Impaired Activities of Daily Living  Goal: Achieve highest/safest level of independence in self care  Description: Interventions:  - Assess ability and encourage patient to participate in ADLs to maximize function  - Promote sitting position while performing ADLs such as feeding, grooming, and bathing  - Educate and encourage patient/family in tolerated functional activity level and precautions during self-care    Outcome: Progressing

## 2023-11-21 NOTE — DISCHARGE INSTRUCTIONS
Going Home    In this section you will find the tools which will guide you through the first few days after you leave the hospital. Continued use of these tools will help you develop the skills necessary to keep your heart failure under control. Heart Failure Guidelines - place this worksheet on your refrigerator or somewhere you can refer to it daily to help you decide if your symptoms are under control, and what to do if they are not. Home Care Instructions Following Heart Failure - the most important things to do every day include:     Weigh yourself  Take your medicines as prescribed  Limit your sodium (salt) and fluid intake  Know when to call your cardiologist, primary doctor, or nurse  Know when to seek emergency care    There is also a handy weight chart on which to record your weight every day, information on measuring fluids and limiting fluid intake, and a section for recording your thoughts or questions. Things for You to Remember:   1. An appointment has been made for you to see your doctor or healthcare provider within 7 days of hospital discharge. It is important that you attend this appointment to make sure your symptoms are under control. 2. Your recommended sodium intake is 2554-8746 mg daily    3. Limit your fluid intake to no more than 2 liters or 64 ounces per day    4. Some exercise and activity is important to help keep your heart functioning and strong. Unless instructed not to exercise, you may walk at a slow to moderate pace for 10-15 minutes 2-3 days per week to start. Pace your activity to prevent shortness of breath or fatigue. Stop exercise if you develop chest pain, lightheadedness, or significant shortness of breath.        Call Your Cardiologist If:   You gain 2 pounds overnight or 3-4 pounds in 3-5 days  You have more difficulty breathing  You are getting more tired with normal activity  You are more short of breath lying down, or awaken at night short of breath  You have swelling of your feet or legs  You urinate less often during the day and more often at night  You have cramps in your legs  You have blurred vision or see yellowish-green halos around objects of lights    Go to the Emergency Room If:   You have pain or tightness in your chest  You are extremely short of breath  You are coughing up pink-frothy mucus  You are traveling and develop symptoms of worsening heart failure    Additional Resources: If you have questions or concerns about heart failure management, call the [address] [place] [address] at [phone]      [facility]  P:[phone]  F:[phone]    Sometimes managing your health at home requires assistance. The [facility] team has recognized your preference to use [facility]. They can be reached by phone at [phone]. The fax number for your reference is [phone]. A representative from the home health agency will contact you or your family to schedule your first visit.

## 2023-11-21 NOTE — CM/SW NOTE
11/21/23 1100   CM/SW Referral Data   Referral Source Social Work (self-referral)   Reason for Referral Discharge planning   Informant Patient   Discharge Needs   Anticipated D/C needs Home health care       SW met with pt at bedside to discuss discharge recommendations. Pt is a 83 y/o male who was admitted for CHF. The pt is alert and oriented x4. Discussed that PT is recommending HHC at discharge. Pt in agreement. SW sent [facility] referrals in [place]. Orders entered. Await options to provide choice list. Pt reports that he is being discharged today.     JORDEN Ashby, [facility]  Discharge [address] [facility].

## 2023-11-21 NOTE — PLAN OF CARE
Pt is a/ox4. On room air. NSR on tele. Vitals stable. External urinary catheter intact, urinating well. Denies pain to right knee at this time. Bed alarm on. Updated the patient on the plan of care and all needs met at this time. Problem: Patient/Family Goals  Goal: Patient/Family Long Term Goal  Description: Patient's Long Term Goal: to go home    Interventions:  - echo, stress test, pain control, tele monitoring, doppler, labs    - See additional Care Plan goals for specific interventions  Outcome: Progressing  Goal: Patient/Family Short Term Goal  Description: Patient's Short Term Goal: feel better     Interventions:   - - echo, stress test, pain control, tele monitoring, doppler, labs    - See additional Care Plan goals for specific interventions  Outcome: Progressing     Problem: CARDIOVASCULAR - ADULT  Goal: Maintains optimal cardiac output and hemodynamic stability  Description: INTERVENTIONS:  - Monitor vital signs, rhythm, and trends  - Monitor for bleeding, hypotension and signs of decreased cardiac output  - Evaluate effectiveness of vasoactive medications to optimize hemodynamic stability  - Monitor arterial and/or venous puncture sites for bleeding and/or hematoma  - Assess quality of pulses, skin color and temperature  - Assess for signs of decreased coronary artery perfusion - ex.  Angina  - Evaluate fluid balance, assess for edema, trend weights  Outcome: Progressing  Goal: Absence of cardiac arrhythmias or at baseline  Description: INTERVENTIONS:  - Continuous cardiac monitoring, monitor vital signs, obtain 12 lead EKG if indicated  - Evaluate effectiveness of antiarrhythmic and heart rate control medications as ordered  - Initiate emergency measures for life threatening arrhythmias  - Monitor electrolytes and administer replacement therapy as ordered  Outcome: Progressing     Problem: PAIN - ADULT  Goal: Verbalizes/displays adequate comfort level or patient's stated pain goal  Description: INTERVENTIONS:  - Encourage pt to monitor pain and request assistance  - Assess pain using appropriate pain scale  - Administer analgesics based on type and severity of pain and evaluate response  - Implement non-pharmacological measures as appropriate and evaluate response  - Consider cultural and social influences on pain and pain management  - Manage/alleviate anxiety  - Utilize distraction and/or relaxation techniques  - Monitor for opioid side effects  - Notify MD/LIP if interventions unsuccessful or patient reports new pain  - Anticipate increased pain with activity and pre-medicate as appropriate  Outcome: Progressing

## 2023-11-21 NOTE — PLAN OF CARE
Pt is Ok to discharge per primary and consults. Pt feeling better, Right leg pain has improved, Norco script sent to his pharmacy. Tolerating ambulation well with SBA, Has walker at home. Discharge instructions including medications and follow ups given and patient and wife verbalize understanding. IV removed, tele monitor discontinued. All belongings taken with pt. Pt transported off unit via wheelchair.

## 2023-11-21 NOTE — HOME CARE LIAISON
Received referral via [facility] for Home Health services. Spoke w/ patient and his wife who is agreeable with [facility]. Patient requests start of care visit to be after Thanksgiving on Friday 11/24. Notified Judith Lee.  Contact information placed on AVS.

## 2023-11-21 NOTE — CM/SW NOTE
11/21/23 1211   Choice of Post-Acute Provider   Informed patient of right to choose their preferred provider Yes   List of appropriate post-acute services provided to patient/family with quality data Yes   Patient/family choice [facility]   Information given to Patient       SW provided pt with the [facility] list. Agreeable to [facility]. Reserved in [address]. SW informed [facility] liaison of the update.      [facility] home [facility]  P:[phone]  [address] [place], [place]  Discharge [address] [facility].

## 2023-11-21 NOTE — PHYSICAL THERAPY NOTE
PHYSICAL THERAPY EVALUATION - INPATIENT     Room Number: 2947/6549-H  Evaluation Date: 11/21/2023  Type of Evaluation: Initial  Physician Order: PT Eval and Treat    Presenting Problem: LE pain, dizziness  Co-Morbidities : prostate CA, R nephrectomy, CHF, glaucoma  Reason for Therapy: Mobility Dysfunction and Discharge Planning      ASSESSMENT   Pt is a 82year old male admitted on 11/19/2023 for dizziness and LE pain. XRAY R knee (-) for fracture. Doppler US R LE (-) for DVT. Functional outcome measures completed include [facility]. The AM-PAC '6-Clicks' Inpatient Basic Mobility Short Form was completed and this patient is demonstrating a Approx Degree of Impairment: 35.83%  degree of impairment in mobility. Research supports that patients with this level of impairment may benefit from [address] PT Discharge Recommendations: Home with home health PT      PLAN  Patient has been evaluated and presents with no skilled Physical Therapy needs at this time. Patient discharged from Physical Therapy services. Please re-order if a new functional limitation presents during this admission. GOALS  Patient was able to achieve the following goals . .. Patient was able to transfer Safely and independently   Patient able to ambulate on level surfaces Safely and independently         HOME SITUATION  Type of Home: House   Home Layout: One level                Lives With: Spouse  Drives: Yes  Patient Owned Equipment: Rolling walker;Cane  Patient Regularly Uses: Glasses    Prior Level of [place]: Pt lives with spouse in single story home. Pt typically independent with ADL and mobility. Pt does not use RW or cane regularly. SUBJECTIVE  \"I think I can walk all the way down. \"       OBJECTIVE  Precautions: None  Fall Risk: Standard fall risk    WEIGHT BEARING RESTRICTION  Weight Bearing Restriction: None                PAIN ASSESSMENT  Rating: 10  Location: R thigh, calf  Management Techniques:  Activity promotion    COGNITION  Overall Cognitive Status:  WFL - within functional limits    RANGE OF MOTION AND STRENGTH ASSESSMENT  Upper extremity ROM and strength are within functional limits     Lower extremity ROM is within functional limits     Lower extremity strength is within functional limits       BALANCE  Static Sitting: Good  Dynamic Sitting: Good  Static Standing: Fair  Dynamic Standing: Fair -    ADDITIONAL TESTS                                    ACTIVITY TOLERANCE                         O2 WALK       NEUROLOGICAL FINDINGS                        AM-PAC '6-Clicks' INPATIENT SHORT FORM - BASIC MOBILITY  How much difficulty does the patient currently have. .. Patient Difficulty: Turning over in bed (including adjusting bedclothes, sheets and blankets)?: None   Patient Difficulty: Sitting down on and standing up from a chair with arms (e.g., wheelchair, bedside commode, etc.): A Little   Patient Difficulty: Moving from lying on back to sitting on the side of the bed?: None   How much help from another person does the patient currently need. .. Help from Another: Moving to and from a bed to a chair (including a wheelchair)?: A Little   Help from Another: Need to walk in hospital room?: A Little   Help from Another: Climbing 3-5 steps with a railing?: A Little       AM-PAC Score:  Raw Score: 20   Approx Degree of Impairment: 35.83%   Standardized Score (AM-PAC Scale): 47.67   CMS Modifier (G-Code): CJ    FUNCTIONAL ABILITY STATUS  Gait Assessment   Functional Mobility/Gait Assessment  Gait Assistance: Supervision  Distance (ft): 200  Assistive Device: Rolling walker  Pattern:  (slow humberto)    Skilled Therapy Provided   Pt received supine in bed and is agreeable to therapy. Pt reports 10/10 R LE pain. Pt supine-sit slowly without assist. Pt demonstrates good static sitting balance at EOB. Pt sit-stand with RW and supervision. Pt ambulated 200ft with RW and supervision.  Pt ambulates with steady step to progressing to step through gait pattern. Pt demonstrates good sequencing and no LOB. Pt tolerated well. Pt does report mild dyspnea. Pt returned to room sitting up in bedside chair. Reviewed activity recommendations. Pt left with call light in reach. Bed Mobility:  Rolling: MOD I  Supine to sit: MOD I   Sit to supine: MOD I     Transfer Mobility:  Sit to stand: supervision   Stand to sit: supervision  Gait = supervision    Therapist's comments: Recommend use of RW. Exercise/Education Provided:  Bed mobility  Energy conservation  Functional activity tolerated  Gait training  Posture  Strengthening  Transfer training    Patient End of Session: Up in chair;Needs met;Call light within reach;RN aware of session/findings; All patient questions and concerns addressed    Patient Evaluation Complexity Level:  History Moderate - 1 or 2 personal factors and/or co-morbidities   Examination of body systems Low - addressing 1-2 elements   Clinical Presentation Low - Stable   Clinical Decision Making Low Complexity       PT Session Time: 20 minutes  Gait Training: 10 minutes

## 2023-11-22 NOTE — CDS QUERY
Yessy Stanley  Dear Dr. Gustavo Ramirez:  Clinical information (provided below) includes a diagnosis of diastolic congestive heart failure with conflicting acuity  Based on your clinical judgement and the clinical indicators included below, could you please clarify the diagnosis of Congestive Heart Failure?    (  x ) Acute on Chronic Diastolic Heart Failure   (   ) Chronic Diastolic Heart Failure   (   ) Other (please specify): ________________    CLINICAL INFORMATION FROM THE MEDICAL RECORD  Clinical Indicators/risk:  ___ 11/19: Pt to ER with dizziness, RLE edema, intermittent chest pain and dyspnea. CXR-Normal heart size and pulmonary vascularity. No focal infiltrate, consolidation, effusion or pneumothorax. Pro-    ___ER Physician's Note:  1+ pitting edema bilateral lower extremities, right lower extremity appears slightly larger than the left. Does appear clinically fluid overloaded however. Clinical Impression: Acute on chronic congestive heart failure    ___H&P: 82-year-old male presents emergency room with right lower extremity swelling. Patient also complains of some chest pain with intermittent dyspnea. Patient states that he is compliant with his diuretics. Patient says that today he also felt very lightheaded and almost passed out while at [employer]. Assessment & Plan: # Chest pain - troponin negative # Rt lower extremity swelling - US negative for DVT. # Near syncope - Will check orthostatic blood cultures - Will check echo #Acute on chronic stage III kidney failure # Chronic diastolic heart failure - will continue on metoprolol.     ___Nephrology Consult Note: HFpEF- compensated; reportedly on maintenance loop diuretics (none listed on home med list)  ___Cardiology Consult: No JVD edema      Treatment: Cardiology Consult, IV Lasix 40mg 11/20, CXR, echocardiogram,         If you have any questions, please contact Clinical  Jayjay Watts RN [phone] Dimas Davalos. [email or web address]  THIS FORM IS A PERMANENT PART OF THE MEDICAL RECORD

## 2023-12-01 ENCOUNTER — HOSPITAL ENCOUNTER (OUTPATIENT)
Dept: GENERAL RADIOLOGY | Age: 88
Discharge: HOME | End: 2023-12-01
Attending: FAMILY MEDICINE
Payer: MEDICARE

## 2023-12-01 DIAGNOSIS — M79.661 PAIN OF RIGHT LOWER LEG: ICD-10-CM

## 2023-12-01 PROCEDURE — 73590 X-RAY EXAM OF LOWER LEG: CPT | Performed by: FAMILY MEDICINE

## 2024-02-16 ENCOUNTER — APPOINTMENT (OUTPATIENT)
Dept: CT IMAGING | Age: 89
End: 2024-02-16
Attending: EMERGENCY MEDICINE
Payer: MEDICARE

## 2024-02-16 ENCOUNTER — HOSPITAL ENCOUNTER (EMERGENCY)
Age: 89
Discharge: HOME | End: 2024-02-16
Attending: EMERGENCY MEDICINE
Payer: MEDICARE

## 2024-02-16 VITALS
TEMPERATURE: 98 F | DIASTOLIC BLOOD PRESSURE: 82 MMHG | HEIGHT: 66 IN | WEIGHT: 158 LBS | HEART RATE: 86 BPM | BODY MASS INDEX: 25.39 KG/M2 | RESPIRATION RATE: 16 BRPM | OXYGEN SATURATION: 95 % | SYSTOLIC BLOOD PRESSURE: 157 MMHG

## 2024-02-16 DIAGNOSIS — R10.9 FLANK PAIN: Primary | ICD-10-CM

## 2024-02-16 LAB
ALBUMIN SERPL-MCNC: 3.1 G/DL (ref 3.4–5)
ALBUMIN/GLOB SERPL: 0.9 {RATIO} (ref 1–2)
ALP LIVER SERPL-CCNC: 50 U/L
ALT SERPL-CCNC: 29 U/L
ANION GAP SERPL CALC-SCNC: 1 MMOL/L (ref 0–18)
AST SERPL-CCNC: 34 U/L (ref 15–37)
BASOPHILS # BLD AUTO: 0.04 X10(3) UL (ref 0–0.2)
BASOPHILS NFR BLD AUTO: 0.9 %
BILIRUB SERPL-MCNC: 0.4 MG/DL (ref 0.1–2)
BILIRUB UR QL STRIP.AUTO: NEGATIVE
BUN BLD-MCNC: 15 MG/DL (ref 9–23)
CALCIUM BLD-MCNC: 8.2 MG/DL (ref 8.5–10.1)
CHLORIDE SERPL-SCNC: 112 MMOL/L (ref 98–112)
CLARITY UR REFRACT.AUTO: CLEAR
CO2 SERPL-SCNC: 28 MMOL/L (ref 21–32)
COLOR UR AUTO: YELLOW
CREAT BLD-MCNC: 1.87 MG/DL
EGFRCR SERPLBLD CKD-EPI 2021: 35 ML/MIN/1.73M2 (ref 60–?)
EOSINOPHIL # BLD AUTO: 0.22 X10(3) UL (ref 0–0.7)
EOSINOPHIL NFR BLD AUTO: 4.8 %
ERYTHROCYTE [DISTWIDTH] IN BLOOD BY AUTOMATED COUNT: 14 %
GLOBULIN PLAS-MCNC: 3.6 G/DL (ref 2.8–4.4)
GLUCOSE BLD-MCNC: 135 MG/DL (ref 70–99)
GLUCOSE UR STRIP.AUTO-MCNC: NEGATIVE MG/DL
GRAN CASTS #/AREA URNS LPF: PRESENT /LPF
HCT VFR BLD AUTO: 38.6 %
HGB BLD-MCNC: 12.8 G/DL
IMM GRANULOCYTES # BLD AUTO: 0.02 X10(3) UL (ref 0–1)
IMM GRANULOCYTES NFR BLD: 0.4 %
KETONES UR STRIP.AUTO-MCNC: NEGATIVE MG/DL
LEUKOCYTE ESTERASE UR QL STRIP.AUTO: NEGATIVE
LIPASE SERPL-CCNC: 17 U/L (ref 13–75)
LYMPHOCYTES # BLD AUTO: 1.07 X10(3) UL (ref 1–4)
LYMPHOCYTES NFR BLD AUTO: 23.5 %
MCH RBC QN AUTO: 33.3 PG (ref 26–34)
MCHC RBC AUTO-ENTMCNC: 33.2 G/DL (ref 31–37)
MCV RBC AUTO: 100.5 FL
MONOCYTES # BLD AUTO: 0.49 X10(3) UL (ref 0.1–1)
MONOCYTES NFR BLD AUTO: 10.7 %
NEUTROPHILS # BLD AUTO: 2.72 X10 (3) UL (ref 1.5–7.7)
NEUTROPHILS # BLD AUTO: 2.72 X10(3) UL (ref 1.5–7.7)
NEUTROPHILS NFR BLD AUTO: 59.7 %
NITRITE UR QL STRIP.AUTO: NEGATIVE
OSMOLALITY SERPL CALC.SUM OF ELEC: 295 MOSM/KG (ref 275–295)
PH UR STRIP.AUTO: 6 [PH] (ref 5–8)
PLATELET # BLD AUTO: 208 10(3)UL (ref 150–450)
POTASSIUM SERPL-SCNC: 3.6 MMOL/L (ref 3.5–5.1)
PROT SERPL-MCNC: 6.7 G/DL (ref 6.4–8.2)
RBC # BLD AUTO: 3.84 X10(6)UL
SODIUM SERPL-SCNC: 141 MMOL/L (ref 136–145)
SP GR UR STRIP.AUTO: 1.02 (ref 1–1.03)
UROBILINOGEN UR STRIP.AUTO-MCNC: 0.2 MG/DL
WBC # BLD AUTO: 4.6 X10(3) UL (ref 4–11)

## 2024-02-16 PROCEDURE — 83690 ASSAY OF LIPASE: CPT | Performed by: EMERGENCY MEDICINE

## 2024-02-16 PROCEDURE — 99285 EMERGENCY DEPT VISIT HI MDM: CPT

## 2024-02-16 PROCEDURE — 80053 COMPREHEN METABOLIC PANEL: CPT | Performed by: EMERGENCY MEDICINE

## 2024-02-16 PROCEDURE — 81015 MICROSCOPIC EXAM OF URINE: CPT | Performed by: EMERGENCY MEDICINE

## 2024-02-16 PROCEDURE — 81001 URINALYSIS AUTO W/SCOPE: CPT | Performed by: EMERGENCY MEDICINE

## 2024-02-16 PROCEDURE — 96374 THER/PROPH/DIAG INJ IV PUSH: CPT

## 2024-02-16 PROCEDURE — 85025 COMPLETE CBC W/AUTO DIFF WBC: CPT | Performed by: EMERGENCY MEDICINE

## 2024-02-16 PROCEDURE — 74176 CT ABD & PELVIS W/O CONTRAST: CPT | Performed by: EMERGENCY MEDICINE

## 2024-02-16 PROCEDURE — 99284 EMERGENCY DEPT VISIT MOD MDM: CPT

## 2024-02-16 RX ORDER — MORPHINE SULFATE 4 MG/ML
4 INJECTION, SOLUTION INTRAMUSCULAR; INTRAVENOUS ONCE
Status: COMPLETED | OUTPATIENT
Start: 2024-02-16 | End: 2024-02-16

## 2024-02-16 RX ORDER — ACETAMINOPHEN AND CODEINE PHOSPHATE 300; 30 MG/1; MG/1
1-2 TABLET ORAL EVERY 6 HOURS PRN
Qty: 10 TABLET | Refills: 0 | Status: SHIPPED | OUTPATIENT
Start: 2024-02-16 | End: 2024-02-21

## 2024-02-17 NOTE — ED PROVIDER NOTES
Patient Seen in: [facility] [place]      History     Chief Complaint   Patient presents with    Abdomen/Flank Pain     Stated Complaint: Flank pain    Subjective:   HPI    Patient is a 88-year-old male comes in with left flank pain.  Started yesterday.  Pretty constant.   He took a pain pill at home without much relief.  No urinary symptoms.  No change bowel bladder habits or hematuria.  No definitive abdominal pain.  No nausea vomiting diarrhea or blood per orifice.  No other associate symptoms.  Pain is worse with changes in position or palpation.  No other complaints.      Objective:   Past Medical History:   Diagnosis Date    Abdominal hernia     Anxiety state, unspecified     Arthritis     Back pain     Cancer ([facility]) 1996    kidney CA , BONES, PROSTATE    Chronic rhinitis     Constipation     CORONARY ARTERY DISEASE     Coronary atherosclerosis     Depression     Diarrhea, unspecified     Dizziness     Endocrine disorder     Esophageal reflux     Frequent use of laxatives     Frequent UTI     Glaucoma     Heart attack ([facility]) 2001, 2011, 2012    x5    Hemorrhoids     High blood pressure     High cholesterol     History of angioplasty     History of kidney cancer     Hypothyroid     Itch of skin     Kidney problem     missing right kidney    Loss of appetite     Nausea     Night sweats     Osteoporosis     Other and unspecified hyperlipidemia     RA (rheumatoid arthritis) ([facility])     Stented coronary artery     Stress     Uncomfortable fullness after meals     Unspecified disorder of thyroid     Unspecified essential hypertension     Unspecified sleep apnea     no CPAP    Visual impairment     Vitamin B 12 deficiency     Vomiting               Past Surgical History:   Procedure Laterality Date    AMPUTATION AT SHOULDER JOINT Right 2010    ANGIOGRAM      ANGIOPLASTY (CORONARY)      4 stents to LAD    APPENDECTOMY      APPENDECTOMY      CATH PERCUTANEOUS  TRANSLUMINAL CORONARY ANGIOPLASTY       CHOLECYSTECTOMY      HEART SURGERY      LAPAROSCOPIC CHOLECYSTECTOMY N/A 2015    Procedure: LAPAROSCOPIC CHOLECYSTECTOMY;  Surgeon: Preston Weber DO;  Location:  MAIN OR    LAPAROSCOPY, SURGICAL; NEPHRECTOMY      Right Kidney    OTHER SURGICAL HISTORY Right 1996    nephrectomy    OTHER SURGICAL HISTORY      vasectomy    REMOVAL GALLBLADDER      SURGICAL STENT  , 2013    coronary    TONSILLECTOMY      VASECTOMY  1971                Social History     Socioeconomic History    Marital status:    Occupational History    Occupation: retired [employer]    Tobacco Use    Smoking status: Former     Packs/day: 0.50     Years: 0.00     Additional pack years: 0.00     Total pack years: 0.00     Types: Cigarettes     Quit date: 1976     Years since quittin.1    Smokeless tobacco: Never    Tobacco comments:     quit 50 yrs ago   Vaping Use    Vaping Use: Never used   Substance and Sexual Activity    Alcohol use: No    Drug use: No   Other Topics Concern     Service Yes     Comment: [employer]. served in [place] & [place]    Social History Narrative    2 sons & adopted daughter     Social Determinants of Health     Food Insecurity: No Food Insecurity (2023)    Food Insecurity     Food Insecurity: Never true   Transportation Needs: No Transportation Needs (2023)    Transportation Needs     Lack of Transportation: No   Housing Stability: Low Risk  (2023)    Housing Stability     Housing Instability: No              Review of Systems    Positive for stated complaint: Flank pain  Other systems are as noted in HPI.  Constitutional and vital signs reviewed.      All other systems reviewed and negative except as noted above.    Physical Exam     ED Triage Vitals [24 1823]   /81   Pulse 80   Resp 16   Temp 98.1 °F (36.7 °C)   Temp src Oral   SpO2 99 %   O2 Device None (Room air)       Current:/81   Pulse 80   Temp 98.1 °F (36.7 °C) (Oral)   Resp 16    Ht 167.6 cm (5' 6\")   Wt 71.7 kg   SpO2 99%   BMI 25.50 kg/m²         Physical Exam  Vitals and nursing note reviewed.   Constitutional:       General: He is not in acute distress.     Appearance: He is well-developed. He is not toxic-appearing.   HENT:      Head: Normocephalic and atraumatic.   Eyes:      General: No scleral icterus.     Conjunctiva/sclera: Conjunctivae normal.   Cardiovascular:      Rate and Rhythm: Normal rate.   Pulmonary:      Effort: Pulmonary effort is normal. No respiratory distress.   Abdominal:      General: There is no distension.      Tenderness: There is left CVA tenderness.   Musculoskeletal:         General: No tenderness. Normal range of motion.      Cervical back: Normal range of motion and neck supple.   Skin:     General: Skin is warm and dry.      Findings: No rash.   Neurological:      General: No focal deficit present.      Mental Status: He is alert and oriented to person, place, and time.      Motor: No abnormal muscle tone.      Coordination: Coordination normal.   Psychiatric:         Behavior: Behavior normal.              ED Course     Labs Reviewed   COMP METABOLIC PANEL (14) - Abnormal; Notable for the following components:       Result Value    Glucose 135 (*)     Creatinine 1.87 (*)     Calcium, Total 8.2 (*)     eGFR-Cr 35 (*)     Albumin 3.1 (*)     A/G Ratio 0.9 (*)     All other components within normal limits   URINALYSIS, ROUTINE - Abnormal; Notable for the following components:    Blood Urine Trace-lysed (*)     Protein Urine 100 mg/dL (*)     All other components within normal limits   UA MICROSCOPIC ONLY, URINE - Abnormal; Notable for the following components:    Bacteria Urine Rare (*)     Squamous Epi. Cells Few (*)     Granular Casts Present (*)     All other components within normal limits   CBC W/ DIFFERENTIAL - Abnormal; Notable for the following components:    HGB 12.8 (*)     HCT 38.6 (*)     .5 (*)     All other components within normal  limits   LIPASE - Normal   CBC WITH DIFFERENTIAL WITH PLATELET    Narrative:     The following orders were created for panel order CBC With Differential With Platelet.  Procedure                               Abnormality         Status                     ---------                               -----------         ------                     CBC W/ DIFFERENTIAL[[id number]]          Abnormal            Final result                 Please view results for these tests on the individual orders.                       MDM        -Tracing on cardiac monitor and pulse oximetry was reviewed by myself.   -The cardiac monitor revealed normal sinus rhythm as interpreted by me. The cardiac monitor was ordered to monitor the patient for dysrhythmia  -Pulse oximetry was interpreted by me and was normal.  Pulse oximeter was ordered to monitor patient for hypoxia.        -History source other than patient -wife        -Comorbidities did add complexity to the management are mentioned in the HPI above        -I personally reviewed the prior external notes and the medical record to obtain additional history I reviewed patient's history and discharge summary in November 2023, patient known to have coronary disease.  He was admitted at that time for heart failure        -DDX: Includes but not limited to -flank pain UTI obstructive uropathy musculoskeletal pain, diverticulitis, acute abdomen             -I personally reviewed the CT findings and it shows no acute findings  Please refer to radiology report for official interpretation    CT ABDOMEN+PELVIS KIDNEYSTONE 2D RNDR(NO IV,NO ORAL)(CPT=74176)   Final Result   PROCEDURE:  CT ABDOMEN+PELVIS KIDNEYSTONE 2D RNDR(NO IV,NO    ORAL)(CPT=74176)       COMPARISON:  [facility], CT ABDOMEN+PELVIS KIDNEYSTONE 2D RNDR(NO IV,NO    ORAL)(CPT=74176), 4/20/2023, 6:01 PM.       INDICATIONS:  Flank pain       TECHNIQUE:  Unenhanced multislice CT scanning from above the kidneys to    below the urinary  bladder.  2D rendering are generated on the CT scanner    workstation to localize potential stones in the cranio-caudal plane.  Dose    reduction techniques were used.    Dose information is transmitted to the ACR (American College of Radiology)    NRDR (National Radiology Data Registry) which includes the Dose Index    Registry.       PATIENT STATED HISTORY: (As transcribed by Technologist)  Lt Flank pain            FINDINGS:     KIDNEYS:  Right nephrectomy.  Normal morphology of the left kidney.  No    urolithiasis or obstructive uropathy.   BLADDER:  No mass, calculus or significant wall thickening.    ADRENALS:  No mass or enlargement.     LIVER:  Normal morphology with suspected cyst or hemangioma of the    posterior segment right hepatic lobe, stable.   BILIARY:  Cholecystectomy.  Normal caliber of the bile ducts.   PANCREAS:  Moderate, diffuse parenchymal atrophy.   SPLEEN:  No enlargement or focal lesion.     AORTA/VASCULAR:  Moderate calcified atherosclerosis without aneurysm.   RETROPERITONEUM:  No mass or adenopathy.     BOWEL/MESENTERY:  The stomach is moderately distended with ingested    material.  Mild fluid distention of small bowel loops of the left mid    abdomen with mild edema/congestion of the small bowel mesentery, features    suggesting a nonspecific enteritis.     Descending/sigmoid colonic diverticulosis.   ABDOMINAL WALL:  No mass or hernia.     BONES:  Stable sclerotic changes of the bony pelvis.  Osteoarthritis of    the hips and spine with orthopedic fixation hardware of the left proximal    femur.  Chronic lower thoracic wedge compression fractures.   PELVIC ORGANS:  Normal for age.     LUNG BASES:  No visible pulmonary or pleural disease.     OTHER:  Negative.                           =====   CONCLUSION:         1. Right nephrectomy.  No urolithiasis or obstructive uropathy.       2. Mild fluid distention of small bowel loops of the left mid abdomen with    edema/congestion of the  small bowel mesentery suggesting a nonspecific    enteritis.               LOCATION:  [facility]           Dictated by (CST): Lela Cherry MD on 2/16/2024 at 6:55 PM        Finalized by (CST): Lela Cherry MD on 2/16/2024 at 7:00 PM             CT findings noted above.  Clinically not like enteritis.  No vomiting diarrhea or abdominal pain.  No GI symptoms.  Incidental findings were also noted.  Patient pain is reproducible with movement and I feel it is more musculoskeletal in nature.  Highly doubt acute abdomen.  Nothing to suggest anginal equivalent here.  Denies any chest pain shortness of breath.  I will send him home with outpatient follow-up return to ER for any concerns or problems.  Shared decision-making employed.  Patient discharged home stable condition.                                           Medical Decision Making      Disposition and Plan     Clinical Impression:  1. Flank pain         Disposition:  Discharge  2/16/2024  8:31 pm    Follow-up:  [facility]  [address]  [facility] [address] [phone]    Follow up            Medications Prescribed:  Current Discharge Medication List        START taking these medications    Details   acetaminophen-codeine 300-30 MG Oral Tab Take 1-2 tablets by mouth every 6 (six) hours as needed for Pain.  Qty: 10 tablet, Refills: 0    Associated Diagnoses: Flank pain

## 2024-04-04 ENCOUNTER — HOSPITAL ENCOUNTER (OUTPATIENT)
Facility: HOSPITAL | Age: 89
Setting detail: OBSERVATION
Discharge: HOME HEALTH | End: 2024-04-05
Attending: EMERGENCY MEDICINE | Admitting: INTERNAL MEDICINE
Payer: MEDICARE

## 2024-04-04 DIAGNOSIS — R19.7 DIARRHEA, UNSPECIFIED TYPE: ICD-10-CM

## 2024-04-04 DIAGNOSIS — N17.9 ACUTE RENAL FAILURE, UNSPECIFIED ACUTE RENAL FAILURE TYPE (HCC): Primary | ICD-10-CM

## 2024-04-04 LAB
ALBUMIN SERPL-MCNC: 3.1 G/DL (ref 3.4–5)
ALBUMIN/GLOB SERPL: 0.7 {RATIO} (ref 1–2)
ALP LIVER SERPL-CCNC: 44 U/L
ALT SERPL-CCNC: 40 U/L
ANION GAP SERPL CALC-SCNC: 12 MMOL/L (ref 0–18)
AST SERPL-CCNC: 54 U/L (ref 15–37)
BASOPHILS # BLD AUTO: 0.02 X10(3) UL (ref 0–0.2)
BASOPHILS NFR BLD AUTO: 0.3 %
BILIRUB SERPL-MCNC: 0.8 MG/DL (ref 0.1–2)
BUN BLD-MCNC: 27 MG/DL (ref 9–23)
CALCIUM BLD-MCNC: 9.5 MG/DL (ref 8.5–10.1)
CHLORIDE SERPL-SCNC: 110 MMOL/L (ref 98–112)
CO2 SERPL-SCNC: 22 MMOL/L (ref 21–32)
CREAT BLD-MCNC: 2.76 MG/DL
EGFRCR SERPLBLD CKD-EPI 2021: 22 ML/MIN/1.73M2 (ref 60–?)
EOSINOPHIL # BLD AUTO: 0 X10(3) UL (ref 0–0.7)
EOSINOPHIL NFR BLD AUTO: 0 %
ERYTHROCYTE [DISTWIDTH] IN BLOOD BY AUTOMATED COUNT: 13.9 %
GLOBULIN PLAS-MCNC: 4.2 G/DL (ref 2.8–4.4)
GLUCOSE BLD-MCNC: 211 MG/DL (ref 70–99)
HCT VFR BLD AUTO: 41.9 %
HGB BLD-MCNC: 14 G/DL
IMM GRANULOCYTES # BLD AUTO: 0.02 X10(3) UL (ref 0–1)
IMM GRANULOCYTES NFR BLD: 0.3 %
LIPASE SERPL-CCNC: 27 U/L (ref 13–75)
LYMPHOCYTES # BLD AUTO: 0.34 X10(3) UL (ref 1–4)
LYMPHOCYTES NFR BLD AUTO: 4.8 %
MCH RBC QN AUTO: 32 PG (ref 26–34)
MCHC RBC AUTO-ENTMCNC: 33.4 G/DL (ref 31–37)
MCV RBC AUTO: 95.7 FL
MONOCYTES # BLD AUTO: 0.33 X10(3) UL (ref 0.1–1)
MONOCYTES NFR BLD AUTO: 4.7 %
NEUTROPHILS # BLD AUTO: 6.36 X10 (3) UL (ref 1.5–7.7)
NEUTROPHILS # BLD AUTO: 6.36 X10(3) UL (ref 1.5–7.7)
NEUTROPHILS NFR BLD AUTO: 89.9 %
OSMOLALITY SERPL CALC.SUM OF ELEC: 309 MOSM/KG (ref 275–295)
PLATELET # BLD AUTO: 214 10(3)UL (ref 150–450)
POTASSIUM SERPL-SCNC: 2.8 MMOL/L (ref 3.5–5.1)
PROT SERPL-MCNC: 7.3 G/DL (ref 6.4–8.2)
RBC # BLD AUTO: 4.38 X10(6)UL
SODIUM SERPL-SCNC: 144 MMOL/L (ref 136–145)
WBC # BLD AUTO: 7.1 X10(3) UL (ref 4–11)

## 2024-04-04 PROCEDURE — 99223 1ST HOSP IP/OBS HIGH 75: CPT | Performed by: INTERNAL MEDICINE

## 2024-04-04 NOTE — ED INITIAL ASSESSMENT (HPI)
Pt to ED c/o diarrhea for 2 days, states he having diarrhea \"at least once an hour\", denies blood in stool/nausea/vomiting/abd pain/fevers

## 2024-04-05 VITALS
TEMPERATURE: 98 F | HEART RATE: 69 BPM | SYSTOLIC BLOOD PRESSURE: 157 MMHG | BODY MASS INDEX: 25.84 KG/M2 | WEIGHT: 151.38 LBS | DIASTOLIC BLOOD PRESSURE: 89 MMHG | OXYGEN SATURATION: 97 % | HEIGHT: 64 IN | RESPIRATION RATE: 18 BRPM

## 2024-04-05 PROBLEM — R19.7 DIARRHEA, UNSPECIFIED TYPE: Status: ACTIVE | Noted: 2024-04-05

## 2024-04-05 LAB
ALBUMIN SERPL-MCNC: 2.9 G/DL (ref 3.4–5)
ALBUMIN/GLOB SERPL: 0.8 {RATIO} (ref 1–2)
ALP LIVER SERPL-CCNC: 43 U/L
ALT SERPL-CCNC: 43 U/L
ANION GAP SERPL CALC-SCNC: 5 MMOL/L (ref 0–18)
AST SERPL-CCNC: 71 U/L (ref 15–37)
BILIRUB SERPL-MCNC: 0.6 MG/DL (ref 0.1–2)
BUN BLD-MCNC: 35 MG/DL (ref 9–23)
CALCIUM BLD-MCNC: 8.7 MG/DL (ref 8.5–10.1)
CHLORIDE SERPL-SCNC: 116 MMOL/L (ref 98–112)
CO2 SERPL-SCNC: 23 MMOL/L (ref 21–32)
CREAT BLD-MCNC: 2.88 MG/DL
EGFRCR SERPLBLD CKD-EPI 2021: 20 ML/MIN/1.73M2 (ref 60–?)
GLOBULIN PLAS-MCNC: 3.6 G/DL (ref 2.8–4.4)
GLUCOSE BLD-MCNC: 116 MG/DL (ref 70–99)
MAGNESIUM SERPL-MCNC: 2.2 MG/DL (ref 1.6–2.6)
OSMOLALITY SERPL CALC.SUM OF ELEC: 307 MOSM/KG (ref 275–295)
POTASSIUM SERPL-SCNC: 3.2 MMOL/L (ref 3.5–5.1)
POTASSIUM SERPL-SCNC: 3.2 MMOL/L (ref 3.5–5.1)
PROT SERPL-MCNC: 6.5 G/DL (ref 6.4–8.2)
SODIUM SERPL-SCNC: 144 MMOL/L (ref 136–145)

## 2024-04-05 PROCEDURE — 99239 HOSP IP/OBS DSCHRG MGMT >30: CPT | Performed by: INTERNAL MEDICINE

## 2024-04-05 RX ORDER — METOCLOPRAMIDE HYDROCHLORIDE 5 MG/ML
5 INJECTION INTRAMUSCULAR; INTRAVENOUS EVERY 8 HOURS PRN
Status: DISCONTINUED | OUTPATIENT
Start: 2024-04-05 | End: 2024-04-05

## 2024-04-05 RX ORDER — HEPARIN SODIUM 5000 [USP'U]/ML
5000 INJECTION, SOLUTION INTRAVENOUS; SUBCUTANEOUS EVERY 8 HOURS SCHEDULED
Status: DISCONTINUED | OUTPATIENT
Start: 2024-04-05 | End: 2024-04-05

## 2024-04-05 RX ORDER — TAMSULOSIN HYDROCHLORIDE 0.4 MG/1
0.4 CAPSULE ORAL NIGHTLY
Status: DISCONTINUED | OUTPATIENT
Start: 2024-04-05 | End: 2024-04-05

## 2024-04-05 RX ORDER — DORZOLAMIDE HCL 20 MG/ML
1 SOLUTION/ DROPS OPHTHALMIC 2 TIMES DAILY
Status: DISCONTINUED | OUTPATIENT
Start: 2024-04-05 | End: 2024-04-05

## 2024-04-05 RX ORDER — PREDNISONE 5 MG/1
5 TABLET ORAL DAILY
Status: DISCONTINUED | OUTPATIENT
Start: 2024-04-05 | End: 2024-04-05

## 2024-04-05 RX ORDER — MELATONIN
3 NIGHTLY PRN
Status: DISCONTINUED | OUTPATIENT
Start: 2024-04-05 | End: 2024-04-05

## 2024-04-05 RX ORDER — POTASSIUM CHLORIDE 20 MEQ/1
40 TABLET, EXTENDED RELEASE ORAL ONCE
Status: COMPLETED | OUTPATIENT
Start: 2024-04-05 | End: 2024-04-05

## 2024-04-05 RX ORDER — METOPROLOL SUCCINATE 25 MG/1
25 TABLET, EXTENDED RELEASE ORAL DAILY
Status: DISCONTINUED | OUTPATIENT
Start: 2024-04-05 | End: 2024-04-05

## 2024-04-05 RX ORDER — SODIUM CHLORIDE 9 MG/ML
INJECTION, SOLUTION INTRAVENOUS CONTINUOUS
Status: DISCONTINUED | OUTPATIENT
Start: 2024-04-05 | End: 2024-04-05

## 2024-04-05 RX ORDER — LEVOTHYROXINE SODIUM 88 UG/1
88 TABLET ORAL
Status: DISCONTINUED | OUTPATIENT
Start: 2024-04-05 | End: 2024-04-05

## 2024-04-05 RX ORDER — ONDANSETRON 2 MG/ML
4 INJECTION INTRAMUSCULAR; INTRAVENOUS EVERY 6 HOURS PRN
Status: DISCONTINUED | OUTPATIENT
Start: 2024-04-05 | End: 2024-04-05

## 2024-04-05 RX ORDER — CLOPIDOGREL BISULFATE 75 MG/1
75 TABLET ORAL DAILY
Status: DISCONTINUED | OUTPATIENT
Start: 2024-04-05 | End: 2024-04-05

## 2024-04-05 RX ORDER — ESCITALOPRAM OXALATE 20 MG/1
20 TABLET ORAL DAILY
Status: DISCONTINUED | OUTPATIENT
Start: 2024-04-05 | End: 2024-04-05

## 2024-04-05 RX ORDER — GABAPENTIN 100 MG/1
100 CAPSULE ORAL 2 TIMES DAILY
Status: DISCONTINUED | OUTPATIENT
Start: 2024-04-05 | End: 2024-04-05

## 2024-04-05 RX ORDER — PANTOPRAZOLE SODIUM 20 MG/1
20 TABLET, DELAYED RELEASE ORAL
Status: DISCONTINUED | OUTPATIENT
Start: 2024-04-05 | End: 2024-04-05 | Stop reason: ALTCHOICE

## 2024-04-05 RX ORDER — HYDROCODONE BITARTRATE AND ACETAMINOPHEN 5; 325 MG/1; MG/1
1-2 TABLET ORAL EVERY 6 HOURS PRN
Status: DISCONTINUED | OUTPATIENT
Start: 2024-04-05 | End: 2024-04-05

## 2024-04-05 RX ORDER — FLUTICASONE PROPIONATE 50 MCG
2 SPRAY, SUSPENSION (ML) NASAL DAILY
Status: DISCONTINUED | OUTPATIENT
Start: 2024-04-05 | End: 2024-04-05

## 2024-04-05 RX ORDER — EZETIMIBE 10 MG/1
10 TABLET ORAL NIGHTLY
Status: DISCONTINUED | OUTPATIENT
Start: 2024-04-05 | End: 2024-04-05

## 2024-04-05 RX ORDER — ASPIRIN 81 MG/1
81 TABLET ORAL EVERY OTHER DAY
Status: DISCONTINUED | OUTPATIENT
Start: 2024-04-05 | End: 2024-04-05

## 2024-04-05 NOTE — PLAN OF CARE
NURSING ADMISSION NOTE      Patient admitted via Cart  Oriented to room.  Safety precautions initiated.  Bed in low position.  Call light in reach.    Navigator completed by charge RN, A&Ox4, room air, NSR on tele, cardiac diet, Brief/ chucked, Non cardiac electrolyte protocol, Heparin for VTE, R AC infusing 0.9NS @ 100ml/hr, K replaced in ED, 2 assist. Isolation r/o c.diff.       Problem: GASTROINTESTINAL - ADULT  Goal: Minimal or absence of nausea and vomiting  Description: INTERVENTIONS:  - Maintain adequate hydration with IV or PO as ordered and tolerated  - Nasogastric tube to low intermittent suction as ordered  - Evaluate effectiveness of ordered antiemetic medications  - Provide nonpharmacologic comfort measures as appropriate  - Advance diet as tolerated, if ordered  - Obtain nutritional consult as needed  - Evaluate fluid balance  Outcome: Progressing  Goal: Maintains or returns to baseline bowel function  Description: INTERVENTIONS:  - Assess bowel function  - Maintain adequate hydration with IV or PO as ordered and tolerated  - Evaluate effectiveness of GI medications  - Encourage mobilization and activity  - Obtain nutritional consult as needed  - Establish a toileting routine/schedule  - Consider collaborating with pharmacy to review patient's medication profile  Outcome: Progressing     Problem: SAFETY ADULT - FALL  Goal: Free from fall injury  Description: INTERVENTIONS:  - Assess pt frequently for physical needs  - Identify cognitive and physical deficits and behaviors that affect risk of falls.  - [place] fall precautions as indicated by assessment.  - Educate pt/family on patient safety including physical limitations  - Instruct pt to call for assistance with activity based on assessment  - Modify environment to reduce risk of injury  - Provide assistive devices as appropriate  - Consider OT/PT consult to assist with strengthening/mobility  - Encourage toileting schedule  Outcome:  Progressing

## 2024-04-05 NOTE — HOME CARE LIAISON
[facility]. Spoke w/ patient who is agreeable with [facility]. Contact information placed on AVS.    
EKG/given to MD Kraus

## 2024-04-05 NOTE — PHYSICAL THERAPY NOTE
PHYSICAL THERAPY EVALUATION - INPATIENT     Room Number: 3621/3621-A  Evaluation Date: 4/5/2024  Type of Evaluation: Initial  Physician Order: PT Eval and Treat    Presenting Problem: diarrhea, hypokalemia, syncope  Co-Morbidities : chronic diastolic heart failure, CAD, BPH, GERd, glaucoma, depression, urinary incontinence, hypothyroid  Reason for Therapy: Mobility Dysfunction and Discharge Planning    Previous Admission: 11/19-11/21/2023: Acute on chronic congestive heart failure; rec HHPT    PHYSICAL THERAPY ASSESSMENT   Patient is currently functioning near baseline with bed mobility, transfers, and gait.  Prior to admission, patient's baseline is independent.  Patient is requiring contact guard assist as a result of the following impairments: decreased functional strength, decreased endurance/aerobic capacity, impaired standing balance, decreased muscular endurance, and medical status.  Physical Therapy will continue to follow for duration of hospitalization.    Patient will benefit from continued skilled PT Services at discharge to promote functional independence in home.  Anticipate patient will return home with home health PT.    PLAN  PT Treatment Plan: Bed mobility;Endurance;Energy conservation;Patient education;Family education;Gait training;Balance training;Transfer training;Strengthening;Body mechanics  Rehab Potential : Good  Frequency (Obs): 3x/week  Number of Visits to Meet Established Goals: 3      CURRENT GOALS    Goal #1 Patient is able to demonstrate supine - sit EOB @ level: modified independent     Goal #2 Patient is able to demonstrate transfers EOB to/from Chair/Wheelchair at assistance level: supervision     Goal #3 Patient is able to ambulate 150 feet with assist device: walker - rolling at assistance level: supervision     Goal #4    Goal #5    Goal #6    Goal Comments: Goals established on 4/5/2024      PHYSICAL THERAPY MEDICAL/SOCIAL HISTORY  History related to current admission:  Patient is a 89 year old male admitted on 2024 from home for diarrhea.  Pt diagnosed with hypokalemia, syncope.      HOME SITUATION  Type of Home: House   Home Layout: One level  Stairs to Enter : 0             Lives With: Spouse  Drives: Yes  Patient Owned Equipment: Cane;Rolling walker  Patient Regularly Uses: Glasses;Hearing aides    Prior Level of [place]: Pt typically indep with ADLs and mobility. Has a RW and cane if needed. Wife does grocery shopping and cooks dinner, he makes breakfast.     SUBJECTIVE  \"I came in because I couldn't stop going to the bathroom, now I can't start\"       OBJECTIVE  Precautions: Bed/chair alarm  Fall Risk: Standard fall risk    WEIGHT BEARING RESTRICTION  Weight Bearing Restriction: None                PAIN ASSESSMENT  Ratin          COGNITION  Overall Cognitive Status:  WFL - within functional limits    RANGE OF MOTION AND STRENGTH ASSESSMENT  Upper extremity ROM and strength are within functional limits      Lower extremity ROM is within functional limits  - mild R weakness with elbow extension    Lower extremity strength is within functional limits - 5/5 throughout       BALANCE  Static Sitting: Fair +  Dynamic Sitting: Fair +  Static Standing: Poor +  Dynamic Standing: Poor +    ADDITIONAL TESTS                                    ACTIVITY TOLERANCE  Pulse: 64        BP: (!) 162/82             O2 WALK  Oxygen Therapy  SPO2% on Room Air at Rest: 95    NEUROLOGICAL FINDINGS                        AM-PAC '6-Clicks' INPATIENT SHORT FORM - BASIC MOBILITY  How much difficulty does the patient currently have...  Patient Difficulty: Turning over in bed (including adjusting bedclothes, sheets and blankets)?: A Little   Patient Difficulty: Sitting down on and standing up from a chair with arms (e.g., wheelchair, bedside commode, etc.): A Little   Patient Difficulty: Moving from lying on back to sitting on the side of the bed?: A Little   How much help from another person  does the patient currently need...   Help from Another: Moving to and from a bed to a chair (including a wheelchair)?: A Little   Help from Another: Need to walk in hospital room?: A Little   Help from Another: Climbing 3-5 steps with a railing?: A Little       AM-PAC Score:  Raw Score: 18   Approx Degree of Impairment: 46.58%   Standardized Score (AM-PAC Scale): 43.63   CMS Modifier (G-Code): CK    FUNCTIONAL ABILITY STATUS  Gait Assessment   Functional Mobility/Gait Assessment  Gait Assistance: Supervision;Contact guard assist  Distance (ft): 150  Assistive Device: Rolling walker  Pattern:  (short step/stride length)    Skilled Therapy Provided     Gait Training:   Pt cued on upright standing posture to improve alignment with UEs  Pt cued on gait sequencing with RW  Pt cued on proper UE placement on RW handles  Pt instructed to continue utilizing RW for improved balance and energy conservation; pt instructed to utilize at home; pt in agreement    Therapeutic Activity:   Pt encouraged log roll technique to facilitate supine to sit transfer  Pt cued on placement of UE and LEs for optimal force generation and safe STS transfer.   Pt cued on usage of arm rests for controlled descent into sitting  Pt cued on utilization of grab bar for toilet transfer        Bed Mobility:  Rolling: NT  Supine to sit: CGA   Sit to supine: NT     Transfer Mobility:  Sit to stand: CGA   Stand to sit: CGA  Gait = CGA    Therapist's Comments: RN cleared for session. Pt agreeable for therapy, received supine. Pt ambulated to bathroom, assisted with donning/doffing brief, small BM, performed charli-care indep. Instructed to call for nursing staff for any needs and OOB mobility.     Exercise/Education Provided:  Bed mobility  Body mechanics  Energy conservation  Functional activity tolerated  Gait training  Posture  Strengthening  Transfer training    Patient End of Session: In bed;Needs met;Call light within reach;RN aware of  session/findings;All patient questions and concerns addressed;Alarm set      Patient Evaluation Complexity Level:  History High - 3 or more personal factors and/or co-morbidities   Examination of body systems Low - addressing 1-2 elements   Clinical Presentation Low - Stable   Clinical Decision Making Low - Stable       PT Session Time: 30 minutes  Gait Trainin minutes  Therapeutic Activity: 5 minutes

## 2024-04-05 NOTE — H&P
[facility]     Agustin Pardo Patient Status:  Emergency    1935 MRN [id number]   [facility] EMERGENCY DEPARTMENT Attending Hugo Lerma MD   Hosp Day # 0 PCP RORO SMILEY     Chief Complaint: diarrhea     Subjective:    History of Present Illness:     Agustin Pardo is a 89 year old male with CAD, depression and anxiety presented to the emergency room with 2 days of diarrhea and generalized weakness.  He sounds like he also had a pretty syncopal episode at home and fell but did not lose consciousness.  He seems very dehydrated in the emergency room and found to have BRAYAN.  He denies any abdominal pain.  No chest pain or shortness of breath.  No palpitation.  No fever or chills.    History/Other:    Past Medical History:  Past Medical History:   Diagnosis Date    Abdominal hernia     Anxiety state, unspecified     Arthritis     Back pain     Cancer ([facility])     kidney CA , BONES, PROSTATE    Chronic rhinitis     Constipation     CORONARY ARTERY DISEASE     Coronary atherosclerosis     Depression     Diarrhea, unspecified     Dizziness     Endocrine disorder     Esophageal reflux     Frequent use of laxatives     Frequent UTI     Glaucoma     Heart attack (HCC) , , 2012    x5    Hemorrhoids     High blood pressure     High cholesterol     History of angioplasty     History of kidney cancer     Hypothyroid     Itch of skin     Kidney problem     missing right kidney    Loss of appetite     Nausea     Night sweats     Osteoporosis     Other and unspecified hyperlipidemia     RA (rheumatoid arthritis) ([facility])     Stented coronary artery     Stress     Uncomfortable fullness after meals     Unspecified disorder of thyroid     Unspecified essential hypertension     Unspecified sleep apnea     no CPAP    Visual impairment     Vitamin B 12 deficiency     Vomiting      Past Surgical History:   Past Surgical History:   Procedure Laterality Date    AMPUTATION  AT SHOULDER JOINT Right 2010    ANGIOGRAM      ANGIOPLASTY (CORONARY)      4 stents to LAD    APPENDECTOMY      APPENDECTOMY      CATH PERCUTANEOUS  TRANSLUMINAL CORONARY ANGIOPLASTY      CHOLECYSTECTOMY      HEART SURGERY      LAPAROSCOPIC CHOLECYSTECTOMY N/A 03/25/2015    Procedure: LAPAROSCOPIC CHOLECYSTECTOMY;  Surgeon: Preston Weber DO;  Location: [facility] MAIN OR    LAPAROSCOPY, SURGICAL; NEPHRECTOMY      Right Kidney    OTHER SURGICAL HISTORY Right 1996    nephrectomy    OTHER SURGICAL HISTORY      vasectomy    REMOVAL GALLBLADDER      SURGICAL STENT  2001, 2/2013    coronary    TONSILLECTOMY      VASECTOMY  1971      Family History:   Family History   Problem Relation Age of Onset    Heart Disorder Father     Other (Other) Father     Other (pulmonary embolism) Father      Social History:    reports that he quit smoking about 47 years ago. His smoking use included cigarettes. He smoked an average of 0.5 packs per day. He has never used smokeless tobacco. He reports that he does not drink alcohol and does not use drugs.     Allergies:   Allergies   Allergen Reactions    Levaquin [Levofloxacin] Myopathy     THINKS IT INTERFERED WITH MY MUSCLE ENZYMES    Aspirin OTHER (SEE COMMENTS) and TINITUS     RINGING IN EARS       Medications:    Current Facility-Administered Medications on File Prior to Encounter   Medication Dose Route Frequency Provider Last Rate Last Admin    [COMPLETED] morphINE PF 4 MG/ML injection 4 mg  4 mg Intravenous Once Landen De La Fuente MD   4 mg at 02/16/24 1904     Current Outpatient Medications on File Prior to Encounter   Medication Sig Dispense Refill    levothyroxine 88 MCG Oral Tab Take 1 tablet (88 mcg total) by mouth before breakfast.      omeprazole 20 MG Oral Capsule Delayed Release Take 1 capsule (20 mg total) by mouth every morning before breakfast.      tamsulosin 0.4 MG Oral Cap Take 1 capsule (0.4 mg total) by mouth at bedtime.      Calcium Carb-Cholecalciferol (CALCIUM 500 + D3 OR)  Take by mouth.      sennosides 8.6 MG Oral Tab Take 1 tablet (8.6 mg total) by mouth as needed.      Abiraterone Acetate 250 MG Oral Tab Take 1,000 mg by mouth daily.        Brinzolamide 1 % Ophthalmic Suspension Place 1 drop into both eyes in the morning and 1 drop before bedtime.      docusate sodium 100 MG Oral Cap Take 1 capsule (100 mg total) by mouth 2 (two) times daily.      Fluticasone Propionate 50 MCG/ACT Nasal Suspension 2 sprays by Each Nare route daily.      gabapentin 100 MG Oral Cap Take 1 capsule (100 mg total) by mouth 2 (two) times daily.      Multiple Vitamins-Minerals (OCUVITE EXTRA) Oral Tab Take 1 tablet by mouth daily.        predniSONE 5 MG Oral Tab Take 1 tablet (5 mg total) by mouth daily.      Rosuvastatin Calcium 20 MG Oral Tab Take 1 tablet (20 mg total) by mouth nightly.      traZODone HCl 50 MG Oral Tab Take 1 tablet (50 mg total) by mouth nightly.      Netarsudil-Latanoprost (ROCKLATAN) 0.02-0.005 % Ophthalmic Solution Place 1 drop into both eyes nightly.      Simethicone 180 MG Oral Cap Take 1 capsule by mouth daily as needed.      Coenzyme Q10 (COQ-10) 200 MG Oral Cap Take 1 capsule by mouth daily.      Clopidogrel Bisulfate (PLAVIX) 75 MG Oral Tab Take 1 tablet (75 mg total) by mouth daily.      Citalopram Hydrobromide (CELEXA) 40 MG Oral Tab Take 1 tablet (40 mg total) by mouth daily.      levocetirizine 5 MG Oral Tab Take 1 tablet (5 mg total) by mouth every evening.      ezetimibe (ZETIA) 10 MG Oral Tab Take 1 tablet (10 mg total) by mouth nightly.      aspirin 81 MG Oral Tab Take 1 tablet (81 mg total) by mouth every other day. Take one tab every other day      Cyanocobalamin (VITAMIN B-12 OR) Take 1,000 mg by mouth nightly. One tab daily      [] acetaminophen-codeine 300-30 MG Oral Tab Take 1-2 tablets by mouth every 6 (six) hours as needed for Pain. 10 tablet 0    HYDROcodone-acetaminophen 5-325 MG Oral Tab Take 1-2 tablets by mouth every 6 (six) hours as needed for  Pain. 15 tablet 0    Metoprolol Succinate ER (TOPROL XL) 25 MG Oral Tablet 24 Hr Take 1 tablet by mouth Daily Beta Blocker. (Patient taking differently: Take 1 tablet (25 mg total) by mouth daily.) 30 tablet 3       Review of Systems:   A comprehensive review of systems was completed.    Pertinent positives and negatives noted in the HPI.    Objective:   Physical Exam:    /80   Pulse 76   Temp 96.5 °F (35.8 °C) (Temporal)   Resp 19   Ht 5' 6\" (1.676 m)   Wt 165 lb (74.8 kg)   SpO2 95%   BMI 26.63 kg/m²   General: No acute distress, Alert  Respiratory: No rhonchi, no wheezes  Cardiovascular: S1, S2. Regular rate and rhythm  Abdomen: Soft, Non-tender, non-distended, positive bowel sounds  Neuro: No new focal deficits  Extremities: No edema      Results:    Labs:      Labs Last 24 Hours:    Recent Labs   Lab 04/04/24  1900   RBC 4.38   HGB 14.0   HCT 41.9   MCV 95.7   MCH 32.0   MCHC 33.4   RDW 13.9   NEPRELIM 6.36   WBC 7.1   .0       Recent Labs   Lab 04/04/24  1900   *   BUN 27*   CREATSERUM 2.76*   EGFRCR 22*   CA 9.5   ALB 3.1*      K 2.8*      CO2 22.0   ALKPHO 44*   AST 54*   ALT 40   BILT 0.8   TP 7.3       Lab Results   Component Value Date    PT 12.7 07/07/2014    INR 0.92 01/23/2021    INR 0.94 03/16/2015    INR 0.96 07/07/2014       No results for input(s): \"TROP\", \"TROPHS\", \"CK\" in the last 168 hours.    No results for input(s): \"TROP\", \"PBNP\" in the last 168 hours.    No results for input(s): \"PCT\" in the last 168 hours.    Imaging: Imaging data reviewed in Epic.    Assessment & Plan:      #diarrhea   - GI stool study  - Cdiff  -Supportive care    # hypokalemia , replace   #Acute on chronic stage III kidney failure  -Continue IV fluid and monitor urine output     # Chronic diastolic heart failure  - will continue on metoprolol.     # CAD status post PCI  -  on B-blocker, plaix, statin.     # BPH  -  on tamsulosin.     #GERD,  on PPI.     # Glaucoma     #  Depression  - on SSRI.     #Urinary incontinence  #Hypothyroidism       Plan of care discussed with patient and spouse, ER.    Alicia Huntley MD    Supplementary Documentation:     The 21st Century Cures Act makes medical notes like these available to patients in the interest of transparency. Please be advised this is a medical document. Medical documents are intended to carry relevant information, facts as evident, and the clinical opinion of the practitioner. The medical note is intended as peer to peer communication and may appear blunt or direct. It is written in medical language and may contain abbreviations or verbiage that are unfamiliar.

## 2024-04-05 NOTE — PROGRESS NOTES
NURSING DISCHARGE NOTE    Discharged Home via Ambulatory.  Accompanied by Family member  Belongings Taken by patient/family.    IV and tele removed, The patient verbalized understanding of the discharge instructions.

## 2024-04-05 NOTE — DISCHARGE SUMMARY
[facility]     Agustin Pardo Patient Status:  Observation    1935 MRN [id number]   Location [facility] 3NE-A Attending Mara Pena DO   Hosp Day # 0 PCP RORO SMILEY     Date of Admission: 2024  Date of Discharge:   2024    Discharge Disposition: Home or Self Care    Discharge Diagnosis:  Acute Gastroenteritis  BRAYAN on CKD    History of Present Illness:      Agustin Pardo is a 89 year old male with CAD, depression and anxiety presented to the emergency room with 2 days of diarrhea and generalized weakness.  He sounds like he also had a pretty syncopal episode at home and fell but did not lose consciousness.  He seems very dehydrated in the emergency room and found to have BRAYAN.  He denies any abdominal pain.  No chest pain or shortness of breath.  No palpitation.  No fever or chills.    Brief Synopsis: Patient was admitted, treated supportively with IV fluids, he dramatically symptomatically improved with IV fluids.  He had no further diarrhea and was able to tolerate diet without issue.  He wanted to be discharged home and this seemed reasonable given his clinical stability.  Patient to have repeat CMP as an outpatient.  Patient being discharged home in stable condition.    Lace+ Score: 82  59-90 High Risk  29-58 Medium Risk  0-28   Low Risk       TCM Follow-Up Recommendation:  LACE > 58: High Risk of readmission after discharge from the hospital.      Procedures during hospitalization:   N/a    Incidental or significant findings and recommendations (brief descriptions):  N/a    Lab/Test results pending at Discharge:   N/a    Consultants:  N/a    Discharge Medication List:     Discharge Medications        CHANGE how you take these medications        Instructions Prescription details   metoprolol succinate ER 25 MG Tb24  Commonly known as: Toprol XL  What changed: when to take this      Take 1 tablet by mouth Daily Beta Blocker.   Quantity: 30 tablet  Refills: 3             CONTINUE taking these medications        Instructions Prescription details   Abiraterone Acetate 250 MG Tabs      Take 1,000 mg by mouth daily.   Refills: 0     aspirin 81 MG Tabs      Take 1 tablet (81 mg total) by mouth every other day. Take one tab every other day   Refills: 0     brinzolamide 1 % Susp  Commonly known as: Azopt      Place 1 drop into both eyes in the morning and 1 drop before bedtime.   Refills: 0     CALCIUM 500 + D3 OR      Take by mouth.   Refills: 0     citalopram 40 MG Tabs  Commonly known as: CeleXA      Take 1 tablet (40 mg total) by mouth daily.   Refills: 0     clopidogrel 75 MG Tabs  Commonly known as: Plavix      Take 1 tablet (75 mg total) by mouth daily.   Refills: 0     CoQ-10 200 MG Caps      Take 1 capsule by mouth daily.   Refills: 0     ezetimibe 10 MG Tabs  Commonly known as: Zetia      Take 1 tablet (10 mg total) by mouth nightly.   Refills: 0     fluticasone propionate 50 MCG/ACT Susp  Commonly known as: Flonase      2 sprays by Each Nare route daily.   Refills: 0     HYDROcodone-acetaminophen 5-325 MG Tabs  Commonly known as: Norco      Take 1-2 tablets by mouth every 6 (six) hours as needed for Pain.   Quantity: 15 tablet  Refills: 0     levocetirizine 5 MG Tabs  Commonly known as: Xyzal      Take 1 tablet (5 mg total) by mouth daily.   Refills: 0     levothyroxine 88 MCG Tabs  Commonly known as: Synthroid      Take 1 tablet (88 mcg total) by mouth before breakfast.   Refills: 0     Ocuvite Extra Tabs      Take 1 tablet by mouth daily.   Refills: 0     predniSONE 5 MG Tabs  Commonly known as: Deltasone      Take 1 tablet (5 mg total) by mouth daily.   Refills: 0     Rocklatan 0.02-0.005 % Soln  Generic drug: Netarsudil-Latanoprost      Place 1 drop into both eyes nightly.   Refills: 0     rosuvastatin 20 MG Tabs  Commonly known as: Crestor      Take 1 tablet (20 mg total) by mouth nightly.   Refills: 0     Simethicone 180 MG Caps      Take 1 capsule by mouth daily  as needed.   Refills: 0     tamsulosin 0.4 MG Caps  Commonly known as: Flomax      Take 1 capsule (0.4 mg total) by mouth at bedtime.   Refills: 0     traZODone 50 MG Tabs  Commonly known as: Desyrel      Take 1 tablet (50 mg total) by mouth nightly.   Refills: 0     VITAMIN B-12 OR      Take 1,000 mg by mouth nightly. One tab daily   Refills: 0            STOP taking these medications      acetaminophen-codeine 300-30 MG Tabs  Commonly known as: Tylenol #3        docusate sodium 100 MG Caps  Commonly known as: Colace        gabapentin 100 MG Caps  Commonly known as: Neurontin        sennosides 8.6 MG Tabs  Commonly known as: Senokot                 [facility] n[facility]a    Follow-up appointment:   [facility] [address]  [facility] [address] [phone]    Schedule an appointment as soon as possible for a visit in 1 week(s)      Appointments for Next 30 Days 2024 - 2024      None            Vital signs:  Temp:  [96.5 °F (35.8 °C)-98.1 °F (36.7 °C)] 97.6 °F (36.4 °C)  Pulse:  [59-82] 64  Resp:  [16-19] 18  BP: ()/(60-87) 162/82  SpO2:  [92 %-99 %] 97 %    Physical Exam:    General: No acute distress   Lungs: clear to auscultation  Cardiovascular: S1, S2  Abdomen: Soft      -----------------------------------------------------------------------------------------------  PATIENT DISCHARGE INSTRUCTIONS: See electronic chart    Mara Pena DO    Total time spent on discharge plannin minutes     The  Cures Act makes medical notes like these available to patients in the interest of transparency. Please be advised this is a medical document. Medical documents are intended to carry relevant information, facts as evident, and the clinical opinion of the practitioner. The medical note is intended as peer to peer communication and may appear blunt or direct. It is written in medical language and may contain abbreviations or verbiage that are unfamiliar.

## 2024-04-05 NOTE — ED QUICK NOTES
Orders for admission, patient is aware of plan and ready to go upstairs. Any questions, please call ED RN Chino Delgado at extension [phone].     Patient Covid vaccination status: Fully vaccinated and immunocompromised     COVID Test Ordered in ED: None    COVID Suspicion at Admission: N/A    Running Infusions:      Mental Status/LOC at time of transport: Alert    Other pertinent information:   CIWA score: N/A   NIH score:  N/A

## 2024-04-05 NOTE — ED PROVIDER NOTES
Patient Seen in: [facility] Emergency Department      History     Chief Complaint   Patient presents with    Diarrhea     Stated Complaint: diarrhea    Subjective:   HPI    89-year-old male presenting with diarrhea.  For the last 2 days patient has had profuse watery loose stools keep swelling himself because she is pouring out of him.  Is nonbloody just very watery loose.  He says he is very weak they have trouble caring for him because of his weakness.  No other exacerbating relieving factors or associated symptoms    Objective:   Past Medical History:   Diagnosis Date    Abdominal hernia     Anxiety state, unspecified     Arthritis     Back pain     Cancer ([facility]) 1996    kidney CA , BONES, PROSTATE    Chronic rhinitis     Constipation     CORONARY ARTERY DISEASE     Coronary atherosclerosis     Depression     Diarrhea, unspecified     Dizziness     Endocrine disorder     Esophageal reflux     Frequent use of laxatives     Frequent UTI     Glaucoma     Heart attack ([facility]) 2001, 2011, 2012    x5    Hemorrhoids     High blood pressure     High cholesterol     History of angioplasty     History of kidney cancer     Hypothyroid     Itch of skin     Kidney problem     missing right kidney    Loss of appetite     Nausea     Night sweats     Osteoporosis     Other and unspecified hyperlipidemia     RA (rheumatoid arthritis) ([facility])     Stented coronary artery     Stress     Uncomfortable fullness after meals     Unspecified disorder of thyroid     Unspecified essential hypertension     Unspecified sleep apnea     no CPAP    Visual impairment     Vitamin B 12 deficiency     Vomiting               Past Surgical History:   Procedure Laterality Date    AMPUTATION AT SHOULDER JOINT Right 2010    ANGIOGRAM      ANGIOPLASTY (CORONARY)      4 stents to LAD    APPENDECTOMY      APPENDECTOMY      CATH PERCUTANEOUS  TRANSLUMINAL CORONARY ANGIOPLASTY      CHOLECYSTECTOMY      HEART SURGERY      LAPAROSCOPIC CHOLECYSTECTOMY N/A  2015    Procedure: LAPAROSCOPIC CHOLECYSTECTOMY;  Surgeon: Preston Weber DO;  Location:  MAIN OR    LAPAROSCOPY, SURGICAL; NEPHRECTOMY      Right Kidney    OTHER SURGICAL HISTORY Right 1996    nephrectomy    OTHER SURGICAL HISTORY      vasectomy    REMOVAL GALLBLADDER      SURGICAL STENT  , 2013    coronary    TONSILLECTOMY      VASECTOMY  1971                Social History     Socioeconomic History    Marital status:    Occupational History    Occupation: retired [employer]    Tobacco Use    Smoking status: Former     Packs/day: 0.50     Years: 0.00     Additional pack years: 0.00     Total pack years: 0.00     Types: Cigarettes     Quit date: 1976     Years since quittin.3    Smokeless tobacco: Never    Tobacco comments:     quit 50 yrs ago   Vaping Use    Vaping Use: Never used   Substance and Sexual Activity    Alcohol use: No    Drug use: No   Other Topics Concern     Service Yes     Comment: [employer]. served in [place] & [place]    Social History Narrative    2 sons & adopted daughter     Social Determinants of Health     Food Insecurity: No Food Insecurity (2023)    Food Insecurity     Food Insecurity: Never true   Transportation Needs: No Transportation Needs (2023)    Transportation Needs     Lack of Transportation: No   Housing Stability: Low Risk  (2023)    Housing Stability     Housing Instability: No              Review of Systems    Positive for stated complaint: diarrhea  Other systems are as noted in HPI.  Constitutional and vital signs reviewed.      All other systems reviewed and negative except as noted above.    Physical Exam     ED Triage Vitals [24 1850]   /75   Pulse 79   Resp 16   Temp 96.5 °F (35.8 °C)   Temp src Temporal   SpO2 99 %   O2 Device None (Room air)       Current:/72   Pulse 82   Temp 96.5 °F (35.8 °C) (Temporal)   Resp 18   Ht 167.6 cm (5' 6\")   Wt 74.8 kg   SpO2 99%   BMI 26.63 kg/m²          Physical Exam    Awake alert patient appears no distress HEENT exam is normal lungs are clear cardiovascular exam shows regular rhythm abdomen soft nontender extremities no Cyanosis or edema no rash back exam is normal skin is nondiaphoretic no focal neurologic deficits skin is dry    ED Course     Labs Reviewed   COMP METABOLIC PANEL (14) - Abnormal; Notable for the following components:       Result Value    Glucose 211 (*)     Potassium 2.8 (*)     BUN 27 (*)     Creatinine 2.76 (*)     Calculated Osmolality 309 (*)     eGFR-Cr 22 (*)     AST 54 (*)     Alkaline Phosphatase 44 (*)     Albumin 3.1 (*)     A/G Ratio 0.7 (*)     All other components within normal limits   CBC W/ DIFFERENTIAL - Abnormal; Notable for the following components:    Lymphocyte Absolute 0.34 (*)     All other components within normal limits   LIPASE - Normal   CBC WITH DIFFERENTIAL WITH PLATELET    Narrative:     The following orders were created for panel order CBC With Differential With Platelet.  Procedure                               Abnormality         Status                     ---------                               -----------         ------                     CBC W/ DIFFERENTIAL[[id number]]          Abnormal            Final result                 Please view results for these tests on the individual orders.   RAINBOW DRAW LAVENDER   RAINBOW DRAW LIGHT GREEN   C. DIFFICILE(TOXIGENIC)PCR   OCCULT BLOOD, STOOL   STOOL CULTURE W/SHIGATOXIN             Differential diagnosis includes renal failure, hyperkalemia         MDM                                         Medical Decision Making  89-year-old male presenting with diarrhea IV established cardiac monitor shows a sinus rhythm pulse ox shows no signs of hypoxia CBC shows a stable hemoglobin level metabolic panel shows hypokalemia as well as acute renal failure patient has IV fluids initiated here in the emerged part potassium supplementation patient will be  admitted    Problems Addressed:  Acute renal failure, unspecified acute renal failure type (HCC): acute illness or injury  Diarrhea, unspecified type: acute illness or injury    Amount and/or Complexity of Data Reviewed  Labs: ordered. Decision-making details documented in ED Course.  ECG/medicine tests: ordered and independent interpretation performed. Decision-making details documented in ED Course.        Disposition and Plan     Clinical Impression:  1. Acute renal failure, unspecified acute renal failure type (HCC)    2. Diarrhea, unspecified type         Disposition:  There is no disposition on file for this visit.  There is no disposition time on file for this visit.    Follow-up:  No follow-up provider specified.        Medications Prescribed:  Current Discharge Medication List

## 2024-04-05 NOTE — PLAN OF CARE
The patient is A/Ox4, forgetful at times  On RA, no SOB  Tele - NSR  Vitals Stable  Afebrile  No c/o of pain.  The patient had x1 incontinent BM episode  K replaced per MAR  Voiding  Good appetite noted  PT eval completed, see their notes  LSW is following the case   The patient vomited x1, denies feeling nauseas after, no antinausea medication used  The patient wants to dc home    Discharge orders received   Safety precautions in place. Staff will continue to monitor.               Problem: Patient/Family Goals  Goal: Patient/Family Long Term Goal  Description: Patient's Long Term Goal: dc    Interventions:  - medications   - See additional Care Plan goals for specific interventions  Outcome: Progressing  Goal: Patient/Family Short Term Goal  Description: Patient's Short Term Goal: safety    Interventions:   - frequent rounding   - See additional Care Plan goals for specific interventions  Outcome: Progressing     Problem: GASTROINTESTINAL - ADULT  Goal: Minimal or absence of nausea and vomiting  Description: INTERVENTIONS:  - Maintain adequate hydration with IV or PO as ordered and tolerated  - Nasogastric tube to low intermittent suction as ordered  - Evaluate effectiveness of ordered antiemetic medications  - Provide nonpharmacologic comfort measures as appropriate  - Advance diet as tolerated, if ordered  - Obtain nutritional consult as needed  - Evaluate fluid balance  Outcome: Progressing  Goal: Maintains or returns to baseline bowel function  Description: INTERVENTIONS:  - Assess bowel function  - Maintain adequate hydration with IV or PO as ordered and tolerated  - Evaluate effectiveness of GI medications  - Encourage mobilization and activity  - Obtain nutritional consult as needed  - Establish a toileting routine/schedule  - Consider collaborating with pharmacy to review patient's medication profile  Outcome: Progressing     Problem: SAFETY ADULT - FALL  Goal: Free from fall injury  Description:  INTERVENTIONS:  - Assess pt frequently for physical needs  - Identify cognitive and physical deficits and behaviors that affect risk of falls.  - Kelliher fall precautions as indicated by assessment.  - Educate pt/family on patient safety including physical limitations  - Instruct pt to call for assistance with activity based on assessment  - Modify environment to reduce risk of injury  - Provide assistive devices as appropriate  - Consider OT/PT consult to assist with strengthening/mobility  - Encourage toileting schedule  Outcome: Progressing

## 2024-04-05 NOTE — CM/SW NOTE
04/05/24 1500   Discharge disposition   Expected discharge disposition Home-Health   Post Acute Care Provider [facility]

## 2024-04-05 NOTE — PROGRESS NOTES
Patient seen and examined  Diarrhea has resolved, may be due to bowel regimen, adjusted for discharge, doubt infectious etiology   BRAYAN, stop IVF, UOP has been good, should be able to correct with oral intake which is now improved and back to normal, had full breakfast, will plan repeat outpatient CMP to follow kidney function and mildly elevated liver enzymes  Medically stable and okay for discharge  Further documentation to follow

## 2024-04-05 NOTE — DISCHARGE INSTRUCTIONS
Sometimes managing your health at home requires assistance.  The [facility] team has recognized your preference to use [facility].  They can be reached by phone at [phone].  The fax number for your reference is [phone].  A representative from the home health agency will contact you or your family to schedule your first visit.

## 2024-04-05 NOTE — PROGRESS NOTES
04/05/24 0042 04/05/24 0044 04/05/24 0046   Vitals   /86 119/79 99/60   MAP (mmHg)  --   --  70   BP Location Left arm Left arm Left arm   BP Method Automatic Automatic Automatic   Patient Position Lying Sitting Standing

## 2024-04-05 NOTE — CM/SW NOTE
04/05/24 1400   CM/SW Referral Data   Referral Source Social Work (self-referral)   Reason for Referral Discharge planning   Informant Patient;Clinical Staff Member;EMR     Sw met with pt's wife outside of pt's room to discuss dc plans.  Pt had just vomited so was getting cleaned up.    Pt is 88 yo male, admitted due to diarrhea, hypokalemia, syncope.  PT recs HHC.    HH referral sent in aidin - list provided to wife- she chose [facility].  Reserved in aidin.    Marine Ewing LCSW  /Discharge Planner

## 2024-04-12 ENCOUNTER — LAB ENCOUNTER (OUTPATIENT)
Dept: LAB | Age: 89
End: 2024-04-12
Attending: FAMILY MEDICINE
Payer: COMMERCIAL

## 2024-04-30 NOTE — CM/SW NOTE
04/05/24 1500   Choice of Post-Acute Provider   Informed patient of right to choose their preferred provider Yes   List of appropriate post-acute services provided to patient/family with quality data Yes   Patient/family choice [facility]   Information given to Spouse/Significant other   [facility]osp[facility] financial disclosure given Yes        Detail Level: Generalized Detail Level: Zone Detail Level: Detailed

## 2024-05-04 ENCOUNTER — LAB REQUISITION (OUTPATIENT)
Dept: LAB | Facility: HOSPITAL | Age: 89
End: 2024-05-04
Payer: COMMERCIAL

## 2024-05-04 DIAGNOSIS — N17.9 ACUTE KIDNEY FAILURE, UNSPECIFIED (HCC): ICD-10-CM

## 2024-05-04 LAB
ANION GAP SERPL CALC-SCNC: 4 MMOL/L (ref 0–18)
BUN BLD-MCNC: 21 MG/DL (ref 9–23)
CALCIUM BLD-MCNC: 8.8 MG/DL (ref 8.5–10.1)
CHLORIDE SERPL-SCNC: 111 MMOL/L (ref 98–112)
CO2 SERPL-SCNC: 27 MMOL/L (ref 21–32)
CREAT BLD-MCNC: 2.33 MG/DL
EGFRCR SERPLBLD CKD-EPI 2021: 26 ML/MIN/1.73M2 (ref 60–?)
GLUCOSE BLD-MCNC: 143 MG/DL (ref 70–99)
OSMOLALITY SERPL CALC.SUM OF ELEC: 299 MOSM/KG (ref 275–295)
POTASSIUM SERPL-SCNC: 3.5 MMOL/L (ref 3.5–5.1)
SODIUM SERPL-SCNC: 142 MMOL/L (ref 136–145)

## 2024-05-04 PROCEDURE — 80048 BASIC METABOLIC PNL TOTAL CA: CPT | Performed by: FAMILY MEDICINE

## 2024-06-10 ENCOUNTER — HOSPITAL ENCOUNTER (EMERGENCY)
Age: 89
Discharge: HOME | End: 2024-06-10
Attending: EMERGENCY MEDICINE
Payer: COMMERCIAL

## 2024-06-10 ENCOUNTER — APPOINTMENT (OUTPATIENT)
Dept: GENERAL RADIOLOGY | Age: 89
End: 2024-06-10
Attending: PHYSICIAN ASSISTANT
Payer: COMMERCIAL

## 2024-06-10 ENCOUNTER — APPOINTMENT (OUTPATIENT)
Dept: CT IMAGING | Age: 89
End: 2024-06-10
Attending: PHYSICIAN ASSISTANT
Payer: COMMERCIAL

## 2024-06-10 VITALS
BODY MASS INDEX: 26.52 KG/M2 | TEMPERATURE: 98 F | HEART RATE: 78 BPM | RESPIRATION RATE: 18 BRPM | SYSTOLIC BLOOD PRESSURE: 137 MMHG | DIASTOLIC BLOOD PRESSURE: 90 MMHG | HEIGHT: 66 IN | OXYGEN SATURATION: 97 % | WEIGHT: 165 LBS
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
## 2024-06-10 DIAGNOSIS — T14.8XXA SKIN AVULSION: ICD-10-CM

## 2024-06-10 PROCEDURE — 70450 CT HEAD/BRAIN W/O DYE: CPT | Performed by: PHYSICIAN ASSISTANT

## 2024-06-10 PROCEDURE — 93010 ELECTROCARDIOGRAM REPORT: CPT

## 2024-06-10 PROCEDURE — 99284 EMERGENCY DEPT VISIT MOD MDM: CPT

## 2024-06-10 PROCEDURE — 90471 IMMUNIZATION ADMIN: CPT

## 2024-06-10 PROCEDURE — 73110 X-RAY EXAM OF WRIST: CPT | Performed by: PHYSICIAN ASSISTANT

## 2024-06-10 PROCEDURE — 93005 ELECTROCARDIOGRAM TRACING: CPT

## 2024-06-10 PROCEDURE — 73130 X-RAY EXAM OF HAND: CPT | Performed by: PHYSICIAN ASSISTANT

## 2024-06-10 RX ORDER — ACETAMINOPHEN 500 MG
1000 TABLET ORAL ONCE
Status: COMPLETED | OUTPATIENT
Start: 2024-06-10 | End: 2024-06-10

## 2024-06-11 NOTE — DISCHARGE INSTRUCTIONS
After 3 days, soak the left hand in warm water for 2 minutes and remove Gelfoam.  Remove Steri-Strips in 5 to 10 days from the scalp.

## 2024-06-11 NOTE — ED PROVIDER NOTES
Patient Seen in: [place] In [place]      History     Chief Complaint   Patient presents with    Fall     Stated Complaint: fell outside. on thinners    Subjective:   HPI    89-year-old gentleman.  Moderate medical history as listed below.  Currently on blood thinners for coronary artery disease status post multiple stent placement.  This evening, the patient was taking his daily walk.  He tripped over a area of uneven concrete sustaining a fall.  He fell striking his forehead on the concrete.  He also  sustained multiple abrasions to his left hand and wrist region.  He denies any lightheadedness or dizziness prior to the fall.  Normal recent health.  Currently ambulating without difficulty.  Denies any injury to the chest wall.  No shortness of breath.    Objective:   Past Medical History:    Abdominal hernia    Anxiety state, unspecified    Arthritis    Back pain    Cancer (HCC)    kidney CA , BONES, PROSTATE    Chronic rhinitis    Constipation    CORONARY ARTERY DISEASE    Coronary atherosclerosis    Depression    Diarrhea, unspecified    Dizziness    Endocrine disorder    Esophageal reflux    Frequent use of laxatives    Frequent UTI    Glaucoma    Heart attack (HCC)    x5    Hemorrhoids    High blood pressure    High cholesterol    History of angioplasty    History of kidney cancer    Hypothyroid    Itch of skin    Kidney problem    missing right kidney    Loss of appetite    Nausea    Night sweats    Osteoporosis    Other and unspecified hyperlipidemia    RA (rheumatoid arthritis) (HCC)    Stented coronary artery    Stress    Uncomfortable fullness after meals    Unspecified disorder of thyroid    Unspecified essential hypertension    Unspecified sleep apnea    no CPAP    Visual impairment    Vitamin B 12 deficiency    Vomiting              Past Surgical History:   Procedure Laterality Date    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    Angiogram      Angioplasty (coronary)      4 stents  to LAD    Appendectomy      Appendectomy      Cath percutaneous  transluminal coronary angioplasty      Cholecystectomy      Heart surgery      Laparoscopic cholecystectomy N/A 2015    Procedure: LAPAROSCOPIC CHOLECYSTECTOMY;  Surgeon: Preston Weber DO;  Location:  MAIN OR    Laparoscopy, surgical; nephrectomy      Right Kidney    Other surgical history Right 1996    nephrectomy    Other surgical history      vasectomy    Removal gallbladder      Surgical stent  , 2013    coronary    Tonsillectomy      Vasectomy  1971                Social History     Socioeconomic History    Marital status:    Occupational History    Occupation: retired [employer]    Tobacco Use    Smoking status: Former     Current packs/day: 0.00     Types: Cigarettes     Start date: 1976     Quit date: 1976     Years since quittin.5    Smokeless tobacco: Never    Tobacco comments:     quit 50 yrs ago   Vaping Use    Vaping status: Never Used   Substance and Sexual Activity    Alcohol use: No    Drug use: No   Other Topics Concern     Service Yes     Comment: [employer]. served in [place]    Social History Narrative    2 sons & adopted daughter     Social Determinants of Health     Food Insecurity: No Food Insecurity (2024)    Food Insecurity     Food Insecurity: Never true   Transportation Needs: No Transportation Needs (2024)    Transportation Needs     Lack of Transportation: No   Housing Stability: Low Risk  (2024)    Housing Stability     Housing Instability: No              Review of Systems    Positive for stated complaint: fell outside. on thinners  Other systems are as noted in HPI.  Constitutional and vital signs reviewed.      All other systems reviewed and negative except as noted above.    Physical Exam     ED Triage Vitals   BP 06/10/24 2114 (!) 151/93   Pulse 06/10/24 2114 79   Resp 06/10/24 2114 22   Temp 06/10/24 2117 98.2 °F (36.8 °C)   Temp src 06/10/24  2117 Oral   SpO2 06/10/24 2114 96 %   O2 Device 06/10/24 2114 None (Room air)       Current Vitals:   Vital Signs  BP: 145/88  Pulse: 75  Resp: 17  Temp: 98.2 °F (36.8 °C)  Temp src: Oral    Oxygen Therapy  SpO2: 96 %  O2 Device: None (Room air)            Physical Exam      Gen: Well appearing, well groomed, alert and aware x 3  Neck: Supple, full range of motion, no thyromegaly or lymphadenopathy.  No cervical point tenderness.  Eye examination: EOMs are intact, normal conjunctival, PERRLA bilaterally  ENT: No philip sign, raccoon sign or hemotympanum.  No obvious dental injury  Chest wall: No pain to palpation.   No tent sign  Lung: No distress, RR, no retraction, breath sounds are clear bilaterally  Cardio: Regular rate and rhythm, normal S1-S2, no murmur appreciable  Abdominal: Soft exam.  No distention.  No fluid wave.  No pain to palpation  Extremities: Full ROM, no deformity, NVI  Back: Full range of motion  Skin:  small skin tear to the left wrist and base of the left fifth digit.  Abrasion with small underlying hematoma to the scalp apex  Neuro: Cranial nerves intact (taste and smell omited), Normal Gait.    ED Course   Labs Reviewed - No data to display  EKG    Rate, intervals and axes as noted on EKG Report.  Rate: 71  Rhythm: Sinus Rhythm  Reading: Normal sinus rhythm.  Right bundle branch block no longer evident.                 XR WRIST COMPLETE (MIN 3 VIEWS), LEFT (CPT=73110)    Result Date: 6/10/2024  PROCEDURE:  XR WRIST COMPLETE (MIN 3 VIEWS), LEFT (CPT=73110)  TECHNIQUE:  Three views were obtained.  COMPARISON:  None.  INDICATIONS:  fell outside. on thinners  PATIENT STATED HISTORY: (As transcribed by Technologist)  Patient shares he fell and injured his left hand and wrist.    FINDINGS:  No acute fractures or osseous lesions are identified.  Radial carpal relationship is within normal limits.  No scapholunate widening.  Osteoarthritic changes greatest the carpal 1st metacarpal junction.             CONCLUSION:  No acute fractures.  Osteoarthritic changes.   LOCATION:  [facility]   Dictated by (CST): Guillermo Holt MD on 6/10/2024 at 10:09 PM     Finalized by (CST): Guillermo Holt MD on 6/10/2024 at 10:11 PM       XR HAND (MIN 3 VIEWS), LEFT (CPT=73130)    Result Date: 6/10/2024  PROCEDURE:  XR HAND (MIN 3 VIEWS), LEFT (CPT=73130)  TECHNIQUE:  Three views of the left hand were obtained.  COMPARISON:  None.  INDICATIONS:  fell outside. on thinners.  Pain  PATIENT STATED HISTORY: (As transcribed by Technologist)  Patient shares he fell and injured his left hand and wrist.    FINDINGS:  No acute fractures or osseous lesions are identified.  Phalangeal relationships are within normal limits.  No scapholunate widening.  Radial carpal relationship is normal.  Osteoarthritic changes greatest the carpal 1st metacarpal junction.             CONCLUSION:  No acute fractures.  Osteoarthritic changes.   LOCATION:  [facility]   Dictated by ([facility]): Guillermo Holt MD on 6/10/2024 at 10:08 PM     Finalized by (CST): Guillermo Holt MD on 6/10/2024 at 10:08 PM       CT BRAIN OR HEAD (10464)    Result Date: 6/10/2024  PROCEDURE:  CT BRAIN OR HEAD (38144)  COMPARISON:  [facility] , CT, CT BRAIN OR HEAD (37343), 11/22/2021, 10:08 PM.  [place], CT, CT BRAIN OR HEAD (35002), 11/18/2021, 5:48 PM.  INDICATIONS:  fell outside. on thinners  TECHNIQUE:  Noncontrast CT scanning is performed through the brain. Dose reduction techniques were used. Dose information is transmitted to the ACR (American College of Radiology) NRDR (National Radiology Data Registry) which includes the Dose Index Registry.  PATIENT STATED HISTORY: (As transcribed by Technologist)  fell outside. on thinners.    FINDINGS:  There is cerebral atrophy with symmetric prominence of the ventricles. There are patchy areas of low attenuation in the periventricular and deep white matter which are nonspecific but most consistent with small vessel ischemic changes.  Again  identified is partially calcified lesion along the left aspect of the anterior falx measuring 1.3 x 1.2 x 1.2 cm likely representing meningioma.  The visualized paranasal sinuses show no significant sinus disease.. No evidence of depressed skull fracture.            CONCLUSION: 1. No acute intracranial findings 2. Cerebral atrophy with chronic microvascular ischemic changes. 3. Stable 1.3 cm partially calcified meningioma along the anterior falx.     LOCATION:  [facility]   Dictated by (CST): Guillermo Holt MD on 6/10/2024 at 9:53 PM     Finalized by (CST): Guillermo Holt MD on 6/10/2024 at 9:57 PM               MDM          Abrasion and hematoma to the central scalp.  Skin tear to the left wrist with surrounding ecchymosis.    Mechanical trip.  No prodrome of lightheadedness, dizziness or chest pain.  Normal recent health.  Feels well.    CT of the brain.  Plain films of the left hand and wrist.  Tetanus updated.  A sepsis to all abrasions    EKG    My supervising physician was involved in the management of this patient.          Plain films of the hand and wrist demonstrate no acute bony abnormality.  CT of the brain demonstrates no acute intracranial process    A sepsis performed all wounds.  To the scalp hematoma there is a small skin flap.  This was reapproximated and a Steri-Strip was applied.    To the medial base of the left fifth digit there is a skin avulsion measuring 1 cm x 1 cm.  Gelfoam applied to this area after a sepsis.  Regular bandage applied.      After 3 days, soak the left hand in warm water for 2 minutes and remove Gelfoam. Remove Steri-Strips in 5 to 10 days from the scalp.                    Medical Decision Making      Disposition and Plan     Clinical Impression:  1. Fall, initial encounter    2. Skin avulsion         Disposition:  There is no disposition on file for this visit.  There is no disposition time on file for this visit.    Follow-up:  [facility]  [address] [place] [address]  [address]  [facility] [id number] [phone]    Follow up            Medications Prescribed:  Current Discharge Medication List

## 2024-06-12 LAB
ATRIAL RATE: 71 BPM
P AXIS: 45 DEGREES
P-R INTERVAL: 136 MS
Q-T INTERVAL: 424 MS
QRS DURATION: 102 MS
QTC CALCULATION (BEZET): 460 MS
R AXIS: -46 DEGREES
T AXIS: 28 DEGREES
VENTRICULAR RATE: 71 BPM

## 2024-07-22 ENCOUNTER — HOSPITAL ENCOUNTER (OUTPATIENT)
Facility: HOSPITAL | Age: 89
Setting detail: OBSERVATION
Discharge: HOME HEALTH | End: 2024-07-23
Attending: EMERGENCY MEDICINE | Admitting: HOSPITALIST
Payer: MEDICARE

## 2024-07-22 ENCOUNTER — APPOINTMENT (OUTPATIENT)
Dept: GENERAL RADIOLOGY | Age: 89
End: 2024-07-22
Attending: EMERGENCY MEDICINE
Payer: MEDICARE

## 2024-07-22 DIAGNOSIS — K92.2 GASTROINTESTINAL HEMORRHAGE, UNSPECIFIED GASTROINTESTINAL HEMORRHAGE TYPE: Primary | ICD-10-CM

## 2024-07-22 DIAGNOSIS — E87.6 HYPOKALEMIA: ICD-10-CM

## 2024-07-22 DIAGNOSIS — R53.1 WEAKNESS GENERALIZED: ICD-10-CM

## 2024-07-22 LAB
ALBUMIN SERPL-MCNC: 2.9 G/DL (ref 3.4–5)
ALBUMIN/GLOB SERPL: 0.9 {RATIO} (ref 1–2)
ALP LIVER SERPL-CCNC: 39 U/L
ALT SERPL-CCNC: 34 U/L
ANION GAP SERPL CALC-SCNC: 6 MMOL/L (ref 0–18)
ANTIBODY SCREEN: NEGATIVE
AST SERPL-CCNC: 36 U/L (ref 15–37)
ATRIAL RATE: 87 BPM
BASOPHILS # BLD AUTO: 0.03 X10(3) UL (ref 0–0.2)
BASOPHILS NFR BLD AUTO: 0.8 %
BILIRUB SERPL-MCNC: 0.5 MG/DL (ref 0.1–2)
BILIRUB UR QL STRIP.AUTO: NEGATIVE
BUN BLD-MCNC: 22 MG/DL (ref 9–23)
CALCIUM BLD-MCNC: 9 MG/DL (ref 8.5–10.1)
CHLORIDE SERPL-SCNC: 111 MMOL/L (ref 98–112)
CLARITY UR REFRACT.AUTO: CLEAR
CO2 SERPL-SCNC: 24 MMOL/L (ref 21–32)
COLOR UR AUTO: YELLOW
CREAT BLD-MCNC: 2.19 MG/DL
EGFRCR SERPLBLD CKD-EPI 2021: 28 ML/MIN/1.73M2 (ref 60–?)
EOSINOPHIL # BLD AUTO: 0.21 X10(3) UL (ref 0–0.7)
EOSINOPHIL NFR BLD AUTO: 5.6 %
ERYTHROCYTE [DISTWIDTH] IN BLOOD BY AUTOMATED COUNT: 15 %
GLOBULIN PLAS-MCNC: 3.2 G/DL (ref 2.8–4.4)
GLUCOSE BLD-MCNC: 137 MG/DL (ref 70–99)
GLUCOSE UR STRIP.AUTO-MCNC: NEGATIVE MG/DL
HCT VFR BLD AUTO: 33.4 %
HGB BLD-MCNC: 11.1 G/DL
IMM GRANULOCYTES # BLD AUTO: 0.01 X10(3) UL (ref 0–1)
IMM GRANULOCYTES NFR BLD: 0.3 %
KETONES UR STRIP.AUTO-MCNC: NEGATIVE MG/DL
LEUKOCYTE ESTERASE UR QL STRIP.AUTO: NEGATIVE
LYMPHOCYTES # BLD AUTO: 1.03 X10(3) UL (ref 1–4)
LYMPHOCYTES NFR BLD AUTO: 27.5 %
MCH RBC QN AUTO: 32.9 PG (ref 26–34)
MCHC RBC AUTO-ENTMCNC: 33.2 G/DL (ref 31–37)
MCV RBC AUTO: 99.1 FL
MONOCYTES # BLD AUTO: 0.26 X10(3) UL (ref 0.1–1)
MONOCYTES NFR BLD AUTO: 6.9 %
NEUTROPHILS # BLD AUTO: 2.21 X10 (3) UL (ref 1.5–7.7)
NEUTROPHILS # BLD AUTO: 2.21 X10(3) UL (ref 1.5–7.7)
NEUTROPHILS NFR BLD AUTO: 58.9 %
NITRITE UR QL STRIP.AUTO: NEGATIVE
OSMOLALITY SERPL CALC.SUM OF ELEC: 297 MOSM/KG (ref 275–295)
P AXIS: 36 DEGREES
P-R INTERVAL: 140 MS
PH UR STRIP.AUTO: 5.5 [PH] (ref 5–8)
PLATELET # BLD AUTO: 154 10(3)UL (ref 150–450)
POTASSIUM SERPL-SCNC: 3.2 MMOL/L (ref 3.5–5.1)
PROT SERPL-MCNC: 6.1 G/DL (ref 6.4–8.2)
Q-T INTERVAL: 392 MS
QRS DURATION: 96 MS
QTC CALCULATION (BEZET): 471 MS
R AXIS: -57 DEGREES
RBC # BLD AUTO: 3.37 X10(6)UL
RBC UR QL AUTO: NEGATIVE
RH BLOOD TYPE: POSITIVE
SODIUM SERPL-SCNC: 141 MMOL/L (ref 136–145)
SP GR UR STRIP.AUTO: 1.01 (ref 1–1.03)
T AXIS: 32 DEGREES
UROBILINOGEN UR STRIP.AUTO-MCNC: 0.2 MG/DL
VENTRICULAR RATE: 87 BPM
WBC # BLD AUTO: 3.8 X10(3) UL (ref 4–11)

## 2024-07-22 PROCEDURE — 71045 X-RAY EXAM CHEST 1 VIEW: CPT | Performed by: EMERGENCY MEDICINE

## 2024-07-22 PROCEDURE — 99223 1ST HOSP IP/OBS HIGH 75: CPT | Performed by: INTERNAL MEDICINE

## 2024-07-22 RX ORDER — METOPROLOL SUCCINATE 25 MG/1
25 TABLET, EXTENDED RELEASE ORAL DAILY
Status: DISCONTINUED | OUTPATIENT
Start: 2024-07-23 | End: 2024-07-22

## 2024-07-22 RX ORDER — SENNOSIDES 8.6 MG
17.2 TABLET ORAL NIGHTLY PRN
Status: DISCONTINUED | OUTPATIENT
Start: 2024-07-22 | End: 2024-07-23

## 2024-07-22 RX ORDER — HYDROCODONE BITARTRATE AND ACETAMINOPHEN 5; 325 MG/1; MG/1
1-2 TABLET ORAL EVERY 6 HOURS PRN
Status: DISCONTINUED | OUTPATIENT
Start: 2024-07-22 | End: 2024-07-23

## 2024-07-22 RX ORDER — ESCITALOPRAM OXALATE 10 MG/1
20 TABLET ORAL DAILY
Status: DISCONTINUED | OUTPATIENT
Start: 2024-07-23 | End: 2024-07-22

## 2024-07-22 RX ORDER — ACETAMINOPHEN 500 MG
500 TABLET ORAL EVERY 4 HOURS PRN
Status: DISCONTINUED | OUTPATIENT
Start: 2024-07-22 | End: 2024-07-23

## 2024-07-22 RX ORDER — ONDANSETRON 2 MG/ML
4 INJECTION INTRAMUSCULAR; INTRAVENOUS EVERY 6 HOURS PRN
Status: DISCONTINUED | OUTPATIENT
Start: 2024-07-22 | End: 2024-07-23

## 2024-07-22 RX ORDER — POLYETHYLENE GLYCOL 3350 17 G/17G
17 POWDER, FOR SOLUTION ORAL DAILY PRN
Status: DISCONTINUED | OUTPATIENT
Start: 2024-07-22 | End: 2024-07-23

## 2024-07-22 RX ORDER — POTASSIUM CHLORIDE 14.9 MG/ML
20 INJECTION INTRAVENOUS ONCE
Status: COMPLETED | OUTPATIENT
Start: 2024-07-22 | End: 2024-07-22

## 2024-07-22 RX ORDER — SODIUM CHLORIDE 9 MG/ML
INJECTION, SOLUTION INTRAVENOUS CONTINUOUS
Status: ACTIVE | OUTPATIENT
Start: 2024-07-22 | End: 2024-07-23

## 2024-07-22 RX ORDER — MELATONIN
3 NIGHTLY PRN
Status: DISCONTINUED | OUTPATIENT
Start: 2024-07-22 | End: 2024-07-23

## 2024-07-22 RX ORDER — ASPIRIN 81 MG/1
81 TABLET, CHEWABLE ORAL EVERY OTHER DAY
Status: DISCONTINUED | OUTPATIENT
Start: 2024-07-23 | End: 2024-07-23

## 2024-07-22 RX ORDER — FUROSEMIDE 20 MG/1
20 TABLET ORAL AS NEEDED
COMMUNITY

## 2024-07-22 RX ORDER — DORZOLAMIDE HCL 20 MG/ML
1 SOLUTION/ DROPS OPHTHALMIC 3 TIMES DAILY
Status: DISCONTINUED | OUTPATIENT
Start: 2024-07-22 | End: 2024-07-23

## 2024-07-22 RX ORDER — GABAPENTIN 100 MG/1
100 CAPSULE ORAL 3 TIMES DAILY
Status: DISCONTINUED | OUTPATIENT
Start: 2024-07-22 | End: 2024-07-23

## 2024-07-22 RX ORDER — MELATONIN
1000 NIGHTLY
Status: DISCONTINUED | OUTPATIENT
Start: 2024-07-22 | End: 2024-07-23

## 2024-07-22 RX ORDER — EZETIMIBE 10 MG/1
10 TABLET ORAL NIGHTLY
Status: DISCONTINUED | OUTPATIENT
Start: 2024-07-22 | End: 2024-07-23

## 2024-07-22 RX ORDER — CETIRIZINE HYDROCHLORIDE 10 MG/1
10 TABLET ORAL DAILY
Status: DISCONTINUED | OUTPATIENT
Start: 2024-07-23 | End: 2024-07-23

## 2024-07-22 RX ORDER — SODIUM CHLORIDE 9 MG/ML
INJECTION, SOLUTION INTRAVENOUS CONTINUOUS
Status: DISCONTINUED | OUTPATIENT
Start: 2024-07-22 | End: 2024-07-22

## 2024-07-22 RX ORDER — PREDNISONE 5 MG/1
5 TABLET ORAL DAILY
Status: DISCONTINUED | OUTPATIENT
Start: 2024-07-23 | End: 2024-07-23

## 2024-07-22 RX ORDER — TRAZODONE HYDROCHLORIDE 50 MG/1
50 TABLET ORAL NIGHTLY
Status: DISCONTINUED | OUTPATIENT
Start: 2024-07-22 | End: 2024-07-23

## 2024-07-22 RX ORDER — BENZONATATE 200 MG/1
200 CAPSULE ORAL 3 TIMES DAILY PRN
Status: DISCONTINUED | OUTPATIENT
Start: 2024-07-22 | End: 2024-07-23

## 2024-07-22 RX ORDER — LEVOTHYROXINE SODIUM 88 UG/1
88 TABLET ORAL
Status: DISCONTINUED | OUTPATIENT
Start: 2024-07-23 | End: 2024-07-23

## 2024-07-22 RX ORDER — POTASSIUM CHLORIDE 20 MEQ/1
40 TABLET, EXTENDED RELEASE ORAL ONCE
Status: DISCONTINUED | OUTPATIENT
Start: 2024-07-22 | End: 2024-07-22

## 2024-07-22 RX ORDER — ROSUVASTATIN CALCIUM 20 MG/1
40 TABLET, COATED ORAL NIGHTLY
Status: DISCONTINUED | OUTPATIENT
Start: 2024-07-22 | End: 2024-07-23

## 2024-07-22 RX ORDER — ECHINACEA PURPUREA EXTRACT 125 MG
1 TABLET ORAL
Status: DISCONTINUED | OUTPATIENT
Start: 2024-07-22 | End: 2024-07-23

## 2024-07-22 RX ORDER — METOCLOPRAMIDE HYDROCHLORIDE 5 MG/ML
5 INJECTION INTRAMUSCULAR; INTRAVENOUS EVERY 8 HOURS PRN
Status: DISCONTINUED | OUTPATIENT
Start: 2024-07-22 | End: 2024-07-23

## 2024-07-22 RX ORDER — FLUTICASONE PROPIONATE 50 MCG
2 SPRAY, SUSPENSION (ML) NASAL DAILY
Status: DISCONTINUED | OUTPATIENT
Start: 2024-07-23 | End: 2024-07-23

## 2024-07-22 RX ORDER — GABAPENTIN 100 MG/1
100 CAPSULE ORAL 3 TIMES DAILY
COMMUNITY

## 2024-07-22 RX ORDER — BISACODYL 10 MG
10 SUPPOSITORY, RECTAL RECTAL
Status: DISCONTINUED | OUTPATIENT
Start: 2024-07-22 | End: 2024-07-23

## 2024-07-22 RX ORDER — TAMSULOSIN HYDROCHLORIDE 0.4 MG/1
0.4 CAPSULE ORAL NIGHTLY
Status: DISCONTINUED | OUTPATIENT
Start: 2024-07-22 | End: 2024-07-22

## 2024-07-22 NOTE — ED QUICK NOTES
Pt unable to provide urine sample, patient had incontinent episode in brief. Incontinence care given. Pt Attempted to urinate in urinal, no success. MD made aware. Pt remains alert and oriented, denies pain, stable at this time. Call light within reach, wife at bedside.

## 2024-07-22 NOTE — ED QUICK NOTES
Orders for admission, patient is aware of plan and ready to go upstairs. Any questions, please call ED RN Chely at extension [phone].     Patient Covid vaccination status: Fully vaccinated     COVID Test Ordered in ED: None    COVID Suspicion at Admission: N/A    Running Infusions:    sodium chloride 125 mL/hr at 07/22/24 1628        Mental Status/LOC at time of transport: A&OX3    Other pertinent information: Hard of hearing to left ear.   CIWA score: N/A   NIH score:  N/A

## 2024-07-22 NOTE — ED INITIAL ASSESSMENT (HPI)
Pt to ed with c/o fatigue, PT called PCP office and was told to come to ED for greater work-up. Denies any other symptoms   During assessment PT reports pain to rectal area which is making it difficult to sit, last BM today; \"small\"

## 2024-07-22 NOTE — ED PROVIDER NOTES
Patient Seen in: [facility] In [place]      History     Chief Complaint   Patient presents with    Abnormal Labs     Stated Complaint: seen by pcp's nurse today and was told iron levels are low, states he feels weak    Subjective:   HPI    89-year-old male comes to the hospital after being initially told over the weekend by his daughter that she thought he looked anemic.  He has had iron deficient anemia in the past.  He has no complaints at this time.  He states that he went to talk to his primary physician who told him to come to the emergency room.  He does not feel weak at this particular time.  Says he was able to walk from his car from the parking lot into this hospital without any difficulties.  He says at times he can feel weak but he is not feeling weak at this day.  He denies any headaches or dizziness.  No chest pain or troubles breathing at this time.  No abdominal pains.  Nuys any nausea, vomiting but has had some loose bowel movements after having milk of magnesia for constipation earlier in the week.  He denies any fevers.  Occasionally has a cough but no significant cough or shortness of breath this time.  Denying any other complaints this time.   Objective:   Past Medical History:    Abdominal hernia    Anxiety state, unspecified    Arthritis    Back pain    Cancer (HCC)    kidney CA , BONES, PROSTATE    Chronic rhinitis    Constipation    CORONARY ARTERY DISEASE    Coronary atherosclerosis    Depression    Diarrhea, unspecified    Dizziness    Endocrine disorder    Esophageal reflux    Frequent use of laxatives    Frequent UTI    Glaucoma    Heart attack (HCC)    x5    Hemorrhoids    High blood pressure    High cholesterol    History of angioplasty    History of kidney cancer    Hypothyroid    Itch of skin    Kidney problem    missing right kidney    Loss of appetite    Nausea    Night sweats    Osteoporosis    Other and unspecified hyperlipidemia    RA (rheumatoid arthritis)  (HCC)    Stented coronary artery    Stress    Uncomfortable fullness after meals    Unspecified disorder of thyroid    Unspecified essential hypertension    Unspecified sleep apnea    no CPAP    Visual impairment    Vitamin B 12 deficiency    Vomiting              Past Surgical History:   Procedure Laterality Date    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    Angiogram      Angioplasty (coronary)      4 stents to LAD    Appendectomy      Appendectomy      Cath percutaneous  transluminal coronary angioplasty      Cholecystectomy      Heart surgery      Laparoscopic cholecystectomy N/A 2015    Procedure: LAPAROSCOPIC CHOLECYSTECTOMY;  Surgeon: Preston Weber DO;  Location:  MAIN OR    Laparoscopy, surgical; nephrectomy      Right Kidney    Other surgical history Right     nephrectomy    Other surgical history      vasectomy    Removal gallbladder      Surgical stent  , 2013    coronary    Tonsillectomy      Vasectomy  1971                Social History     Socioeconomic History    Marital status:    Occupational History    Occupation: retired [employer]   Tobacco Use    Smoking status: Former     Current packs/day: 0.00     Types: Cigarettes     Start date: 1976     Quit date: 1976     Years since quittin.6    Smokeless tobacco: Never    Tobacco comments:     quit 50 yrs ago   Vaping Use    Vaping status: Never Used   Substance and Sexual Activity    Alcohol use: No    Drug use: No   Other Topics Concern     Service Yes     Comment: [employer]. served in [place] & [place]    Social History Narrative    2 sons & adopted daughter     Social Determinants of Health     Food Insecurity: No Food Insecurity (2024)    Food Insecurity     Food Insecurity: Never true   Transportation Needs: No Transportation Needs (2024)    Transportation Needs     Lack of Transportation: No   Housing Stability: Low Risk  (2024)    Housing Stability     Housing Instability: No               Review of Systems    Positive for stated Chief Complaint: Abnormal Labs    Other systems are as noted in HPI.  Constitutional and vital signs reviewed.      All other systems reviewed and negative except as noted above.    Physical Exam     ED Triage Vitals [07/22/24 1405]   BP (!) 87/58   Pulse 92   Resp 16   Temp 97.8 °F (36.6 °C)   Temp src Temporal   SpO2 97 %   O2 Device None (Room air)       Current Vitals:   Vital Signs  BP: 141/79  Pulse: 73  Resp: 16  Temp: 97.9 °F (36.6 °C)  Temp src: Temporal    Oxygen Therapy  SpO2: 96 %  O2 Device: None (Room air)            Physical Exam    HEENT : NCAT, EOMI, PEERL,  neck supple, no JVD, trachea midline, No LAD  Heart: S1S2 normal. No murmurs, regular rate and rhythm  Lungs: Clear to auscultation bilaterally  Abdomen: Soft nontender nondistended normal active bowel sounds without rebound, guarding or masses noted  Back nontender without CVA tenderness  Extremity no clubbing, cyanosis or edema noted.  Full range of motion noted without tenderness  Neuro: No focal deficits noted    All measures to prevent infection transmission during my interaction with the patient were taken.  The patient was already wearing droplet mask on my arrival to the room.  Personal protective equipment including a droplet mask as well as gloves were worn throughout the duration of my exam.  Hand washing was performed prior to and after the exam.  Stethoscope and equipment used during my examination was cleaned with a super Sani cloth germicidal wipe following the exam.    ED Course     Labs Reviewed   COMP METABOLIC PANEL (14) - Abnormal; Notable for the following components:       Result Value    Glucose 137 (*)     Potassium 3.2 (*)     Creatinine 2.19 (*)     Calculated Osmolality 297 (*)     eGFR-Cr 28 (*)     Alkaline Phosphatase 39 (*)     Total Protein 6.1 (*)     Albumin 2.9 (*)     A/G Ratio 0.9 (*)     All other components within normal limits   CBC W/ DIFFERENTIAL -  Abnormal; Notable for the following components:    WBC 3.8 (*)     RBC 3.37 (*)     HGB 11.1 (*)     HCT 33.4 (*)     All other components within normal limits   CBC WITH DIFFERENTIAL WITH PLATELET    Narrative:     The following orders were created for panel order CBC With Differential With Platelet.  Procedure                               Abnormality         Status                     ---------                               -----------         ------                     CBC W/ DIFFERENTIAL[[id number]]          Abnormal            Final result                 Please view results for these tests on the individual orders.   URINALYSIS, ROUTINE   TYPE AND SCREEN    Narrative:     The following orders were created for panel order Type and screen.  Procedure                               Abnormality         Status                     ---------                               -----------         ------                     ABORH (Blood Type)[[id number]]                               Final result               Antibody Screen[[id number]]                                  Final result                 Please view results for these tests on the individual orders.   ABORH (BLOOD TYPE)   ANTIBODY SCREEN     EKG    Rate, intervals and axes as noted on EKG Report.  Rate: 87  Rhythm: Sinus Rhythm  Reading: , QRS of 96, patient has left anterior fascicular block but no other acute ischemic change.              ED Course as of 07/22/24 1622  ------------------------------------------------------------  Time: 07/22 1619  Comment: While here the patient had a hemoglobin of 11.1.  His prior in April was 14.  His potassium was 3.2 and replaced.  He was orthostatic while here.  His chest x-ray that upper-central showed no acute cardiopulmonary process.  His type and screen.  The patient was given a IV fluid bolus as well as potassium replacement as well as IV Protonix.  He was typed and screened.  I spoke with the hospitalist as well as  GI  Patient placed in the hospital for further admission for his GI bleed with drop in blood pressure and orthostasis.     XR CHEST AP PORTABLE  (CPT=71045)    Result Date: 7/22/2024  PROCEDURE:  XR CHEST AP PORTABLE  (CPT=71045)  TECHNIQUE:  AP chest radiograph was obtained.  COMPARISON:  [facility], XR, XR CHEST AP PORTABLE  (CPT=71045), 11/19/2023, 10:08 PM.  [facility] , XR, XR CHEST AP PORTABLE  (CPT=71045), 10/03/2021, 8:47 AM.  INDICATIONS:  seen by pcp's nurse today and was told iron levels are low, states he feels weak  PATIENT STATED HISTORY: (As transcribed by Technologist)  Sudden onset weakness and fatigue today.    FINDINGS:  No focal consolidation.  No pleural effusion.  No pneumothorax.  Cardiomediastinal silhouette is within normal limits, similar to prior.  Coronary artery stents are in place.  Degenerative changes of the spine.  Right shoulder arthroplasty.            CONCLUSION:  No acute radiographic findings or significant interval change.   LOCATION:  [facility]      Dictated by (CST): Kb Ordonez MD on 7/22/2024 at 3:07 PM     Finalized by (CST): Kb Ordonez MD on 7/22/2024 at 3:08 PM        Medications   sodium chloride 0.9 % IV bolus 500 mL (0 mL Intravenous Stopped 7/22/24 1544)     Followed by   sodium chloride 0.9% infusion (has no administration in time range)   potassium chloride 20 mEq/100mL IVPB premix 20 mEq (20 mEq Intravenous New Bag 7/22/24 1621)   pantoprazole (Protonix) 80 mg in sodium chloride 0.9% PF 20 mL IV push (80 mg Intravenous Given 7/22/24 1618)              MDM      Differential diagnosis included dehydration but not limited such.  Patient does have a GI bleed and has a hemoglobin go from 14-11.1.  He was typed and screened.  IV Protonix was given.  The patient was given potassium replacement.  Spoke with the hospital as well as GI this time patient replaced the hospital for admission.    Critical Care Note:  The patient arrived with a condition with significant  morbidity and mortality associated. The services I provided  were to promote improvement and reduce mortality specifically involving complex record review, complex medical decisions and interventions, and consultations outside the regular procedures and care normally rendered for 45 minutes of critical care time      This note was prepared using Dragon Medical voice recognition dictation software.  As a result errors may occur.  When identified to these areas have been corrected.  While every attempt is made to correct errors during dictation discrepancies may still exist.  Please contact if there are any errors.    Admission disposition: 7/22/2024  4:20 PM                                        Medical Decision Making      Disposition and Plan     Clinical Impression:  1. Gastrointestinal hemorrhage, unspecified gastrointestinal hemorrhage type    2. Weakness generalized    3. Hypokalemia         Disposition:  Admit  7/22/2024  4:20 pm    Follow-up:  No follow-up provider specified.        Medications Prescribed:  Current Discharge Medication List                            Hospital Problems       Present on Admission  Date Reviewed: 4/4/2024            ICD-10-CM Noted POA    * (Principal) Gastrointestinal hemorrhage, unspecified gastrointestinal hemorrhage type K92.2 7/22/2024 Unknown

## 2024-07-23 VITALS
RESPIRATION RATE: 18 BRPM | HEART RATE: 74 BPM | TEMPERATURE: 99 F | WEIGHT: 155 LBS | BODY MASS INDEX: 24.91 KG/M2 | DIASTOLIC BLOOD PRESSURE: 83 MMHG | SYSTOLIC BLOOD PRESSURE: 160 MMHG | HEIGHT: 66 IN | OXYGEN SATURATION: 98 %

## 2024-07-23 LAB
ANION GAP SERPL CALC-SCNC: 3 MMOL/L (ref 0–18)
BASOPHILS # BLD AUTO: 0.02 X10(3) UL (ref 0–0.2)
BASOPHILS NFR BLD AUTO: 0.6 %
BUN BLD-MCNC: 17 MG/DL (ref 9–23)
CALCIUM BLD-MCNC: 8.4 MG/DL (ref 8.7–10.4)
CHLORIDE SERPL-SCNC: 115 MMOL/L (ref 98–112)
CO2 SERPL-SCNC: 26 MMOL/L (ref 21–32)
CREAT BLD-MCNC: 1.88 MG/DL
DEPRECATED HBV CORE AB SER IA-ACNC: 99.8 NG/ML
EGFRCR SERPLBLD CKD-EPI 2021: 34 ML/MIN/1.73M2 (ref 60–?)
EOSINOPHIL # BLD AUTO: 0.2 X10(3) UL (ref 0–0.7)
EOSINOPHIL NFR BLD AUTO: 6.1 %
ERYTHROCYTE [DISTWIDTH] IN BLOOD BY AUTOMATED COUNT: 14.8 %
FOLATE SERPL-MCNC: 18.7 NG/ML (ref 5.4–?)
GLUCOSE BLD-MCNC: 90 MG/DL (ref 70–99)
HCT VFR BLD AUTO: 29.7 %
HGB BLD-MCNC: 10.1 G/DL
HGB BLD-MCNC: 10.2 G/DL
IMM GRANULOCYTES # BLD AUTO: 0.01 X10(3) UL (ref 0–1)
IMM GRANULOCYTES NFR BLD: 0.3 %
INR BLD: 0.99 (ref 0.8–1.2)
IRON SATN MFR SERPL: 23 %
IRON SERPL-MCNC: 58 UG/DL
LYMPHOCYTES # BLD AUTO: 1.04 X10(3) UL (ref 1–4)
LYMPHOCYTES NFR BLD AUTO: 31.5 %
MCH RBC QN AUTO: 33.3 PG (ref 26–34)
MCHC RBC AUTO-ENTMCNC: 34 G/DL (ref 31–37)
MCV RBC AUTO: 98 FL
MONOCYTES # BLD AUTO: 0.26 X10(3) UL (ref 0.1–1)
MONOCYTES NFR BLD AUTO: 7.9 %
NEUTROPHILS # BLD AUTO: 1.77 X10 (3) UL (ref 1.5–7.7)
NEUTROPHILS # BLD AUTO: 1.77 X10(3) UL (ref 1.5–7.7)
NEUTROPHILS NFR BLD AUTO: 53.6 %
OSMOLALITY SERPL CALC.SUM OF ELEC: 299 MOSM/KG (ref 275–295)
PLATELET # BLD AUTO: 134 10(3)UL (ref 150–450)
POTASSIUM SERPL-SCNC: 3.8 MMOL/L (ref 3.5–5.1)
POTASSIUM SERPL-SCNC: 4 MMOL/L (ref 3.5–5.1)
PROTHROMBIN TIME: 13.1 SECONDS (ref 11.6–14.8)
RBC # BLD AUTO: 3.03 X10(6)UL
SODIUM SERPL-SCNC: 144 MMOL/L (ref 136–145)
TOTAL IRON BINDING CAPACITY: 253 UG/DL (ref 250–425)
TRANSFERRIN SERPL-MCNC: 180 MG/DL (ref 215–365)
TSI SER-ACNC: 4.04 MIU/ML (ref 0.55–4.78)
VIT B12 SERPL-MCNC: >2000 PG/ML (ref 211–911)
WBC # BLD AUTO: 3.3 X10(3) UL (ref 4–11)

## 2024-07-23 PROCEDURE — 99239 HOSP IP/OBS DSCHRG MGMT >30: CPT | Performed by: INTERNAL MEDICINE

## 2024-07-23 RX ORDER — PANTOPRAZOLE SODIUM 40 MG/1
40 TABLET, DELAYED RELEASE ORAL
Qty: 30 TABLET | Refills: 0 | Status: SHIPPED | OUTPATIENT
Start: 2024-07-24

## 2024-07-23 RX ORDER — AMLODIPINE BESYLATE 5 MG/1
5 TABLET ORAL DAILY
Status: DISCONTINUED | OUTPATIENT
Start: 2024-07-23 | End: 2024-07-23

## 2024-07-23 RX ORDER — PANTOPRAZOLE SODIUM 40 MG/1
40 TABLET, DELAYED RELEASE ORAL
Status: DISCONTINUED | OUTPATIENT
Start: 2024-07-24 | End: 2024-07-23

## 2024-07-23 NOTE — PLAN OF CARE
Patient admitted for possible GI bleed and weakness. HGB stable at 11.1 on admission. No signs of bleeding noted overnight. Patient denies pain or SOB. Vitals stable, no fever. Patient ambulatory to the bathroom. IVF infusing, Plavix on hold, GI to see the patient. NPO since midnight. Safety precautions in place, call light within reach, plan of care ongoing.     Problem: ANEMIA OF PREMATURITY  Goal: Hematocrit/Hemoblobin within normal range  Description: Interventions:  - Monitor CBC as ordered  - Administer Iron and nutrition supplements as ordered  - Administer epoetin shannon as ordered  - Administer blood products as ordered  - Educate parent/family on condition and treatment plan  Outcome: Progressing

## 2024-07-23 NOTE — CONSULTS
[facility]                       Gastroenterology Consultation-[facility]    Agustin Pardo Patient Status:  Observation    1935 MRN [id number]   Location [facility] 4NW-A Attending Chevy Quiles,    Hosp Day # 0 PCP RORO SMILEY     Reason for consultation: Anemia   HPI: This is an 89 yr-old male with PMhx that includes CAD s/p PCI with stents (Plavix + ASA--last dose ), HF, ANN, RA, CKD, and prostate cancer with bone mets who was admitted overnight with weakness, fatigue found to have anemia with occult + stool--Hgb 11.1 (MCV 99; sat 23, ferritin 100) from Hgb 12-14.  Pt denies abd pain, nausea, vomiting, heartburn, dysphagia, and odynophagia. He reports passing stools daily and denies a change in BM pattern prior to admission. He is not aware of melena or hematochezia. No change in appetite and he reports a robust appetite. No hematuria, epistaxis, or gum bleeding. No recent endoscopic eval--thinks he completed a colonoscopy within the last 8 yrs and an EGD >30 yrs ago (no results available for review). Pt takes a baby ASA every other day and daily Prednisone for his prostate cancer and denies additional NSAID use.   No overt bleeding since admission and pt hungry for food   PMHx:   Past Medical History:    Abdominal hernia    Anxiety state, unspecified    Arthritis    Back pain    Cancer (HCC)    kidney CA , BONES, PROSTATE    Chronic rhinitis    Constipation    CORONARY ARTERY DISEASE    Coronary atherosclerosis    Depression    Diarrhea, unspecified    Dizziness    Endocrine disorder    Esophageal reflux    Frequent use of laxatives    Frequent UTI    Glaucoma    Heart attack (HCC)    x5    Hemorrhoids    High blood pressure    High cholesterol    History of angioplasty    History of kidney cancer    Hypothyroid    Itch of skin    Kidney problem    missing right kidney    Loss of appetite    Nausea    Night sweats    Osteoporosis    Other and unspecified hyperlipidemia     RA (rheumatoid arthritis) (HCC)    Stented coronary artery    Stress    Uncomfortable fullness after meals    Unspecified disorder of thyroid    Unspecified essential hypertension    Unspecified sleep apnea    no CPAP    Visual impairment    Vitamin B 12 deficiency    Vomiting                PSHx:   Past Surgical History:   Procedure Laterality Date    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    Angiogram      Angioplasty (coronary)      4 stents to LAD    Appendectomy      Appendectomy      Cath percutaneous  transluminal coronary angioplasty      Cholecystectomy      Heart surgery      Laparoscopic cholecystectomy N/A 03/25/2015    Procedure: LAPAROSCOPIC CHOLECYSTECTOMY;  Surgeon: Preston Weber DO;  Location:  MAIN OR    Laparoscopy, surgical; nephrectomy      Right Kidney    Other surgical history Right 1996    nephrectomy    Other surgical history      vasectomy    Removal gallbladder      Surgical stent  2001, 2/2013    coronary    Tonsillectomy      Vasectomy  1971     Medications:    potassium chloride 40 mEq in 250mL sodium chloride 0.9% IVPB premix  40 mEq Intravenous Once    [COMPLETED] sodium chloride 0.9 % IV bolus 500 mL  500 mL Intravenous Once    [COMPLETED] potassium chloride 20 mEq/100mL IVPB premix 20 mEq  20 mEq Intravenous Once    [COMPLETED] pantoprazole (Protonix) 80 mg in sodium chloride 0.9% PF 20 mL IV push  80 mg Intravenous Once    pantoprazole (Protonix) 40 mg in sodium chloride 0.9% PF 10 mL IV push  40 mg Intravenous Q12H    sodium chloride 0.9% infusion   Intravenous Continuous    acetaminophen (Tylenol Extra Strength) tab 500 mg  500 mg Oral Q4H PRN    melatonin tab 3 mg  3 mg Oral Nightly PRN    polyethylene glycol (PEG 3350) (Miralax) 17 g oral packet 17 g  17 g Oral Daily PRN    sennosides (Senokot) tab 17.2 mg  17.2 mg Oral Nightly PRN    bisacodyl (Dulcolax) 10 MG rectal suppository 10 mg  10 mg Rectal Daily PRN    ondansetron (Zofran) 4 MG/2ML injection 4 mg  4 mg  Intravenous Q6H PRN    metoclopramide (Reglan) 5 mg/mL injection 5 mg  5 mg Intravenous Q8H PRN    benzonatate (Tessalon) cap 200 mg  200 mg Oral TID PRN    guaiFENesin (Robitussin) 100 MG/5 ML oral liquid 200 mg  200 mg Oral Q4H PRN    glycerin-hypromellose- (Artificial Tears) 0.2-0.2-1 % ophthalmic solution 1 drop  1 drop Both Eyes QID PRN    sodium chloride (Saline Mist) 0.65 % nasal solution 1 spray  1 spray Each Nare Q3H PRN    aspirin chewable tab 81 mg  81 mg Oral QOD    dorzolamide (Trusopt) 2 % ophthalmic solution 1 drop  1 drop Both Eyes TID    cyanocobalamin (Vitamin B12) tab 1,000 mcg  1,000 mcg Oral Nightly    ezetimibe (Zetia) tab 10 mg  10 mg Oral Nightly    fluticasone propionate (Flonase) 50 MCG/ACT nasal suspension 2 spray  2 spray Each Nare Daily    gabapentin (Neurontin) cap 100 mg  100 mg Oral TID    HYDROcodone-acetaminophen (Norco) 5-325 MG per tab 1-2 tablet  1-2 tablet Oral Q6H PRN    cetirizine (ZyrTEC) tab 10 mg  10 mg Oral Daily    levothyroxine (Synthroid) tab 88 mcg  88 mcg Oral Daily @ 0700    [Held by provider] Netarsudil-Latanoprost 0.02-0.005 % SOLN 1 drop  1 drop Both Eyes Nightly    predniSONE (Deltasone) tab 5 mg  5 mg Oral Daily    rosuvastatin (Crestor) tab 40 mg  40 mg Oral Nightly    traZODone (Desyrel) tab 50 mg  50 mg Oral Nightly     Allergies:   Allergies   Allergen Reactions    Levaquin [Levofloxacin] Myopathy     THINKS IT INTERFERED WITH MY MUSCLE ENZYMES    Aspirin OTHER (SEE COMMENTS) and TINITUS     RINGING IN EARS     Social HX:   Social History     Socioeconomic History    Marital status:    Occupational History    Occupation: retired [employer]    Tobacco Use    Smoking status: Former     Current packs/day: 0.00     Types: Cigarettes     Start date: 1976     Quit date: 1976     Years since quittin.6    Smokeless tobacco: Never    Tobacco comments:     quit 50 yrs ago   Vaping Use    Vaping status: Never Used   Substance and Sexual  Activity    Alcohol use: No    Drug use: No   Other Topics Concern     Service Yes     Comment: [employer]. served in [place] & [place]    Social History Narrative    2 sons & adopted daughter     Social Determinants of Health     Food Insecurity: No Food Insecurity (7/23/2024)    Food Insecurity     Food Insecurity: Never true   Transportation Needs: No Transportation Needs (7/23/2024)    Transportation Needs     Lack of Transportation: No   Housing Stability: Low Risk  (7/23/2024)    Housing Stability     Housing Instability: No      FamHx: The patient has no family history of colon cancer or other gastrointestinal malignancies;  No family history of ulcer disease, or inflammatory bowel disease  ROS:  In addition to the pertinent positives described above:            Infectious Disease:  No chronic infections or recent fevers prior to the acute illness            Cardiovascular: + CAD s/p PCI, HF            Respiratory: No shortness of breath, asthma, copd, recurrent pneumonia            Hematologic: The patient reports no easy bruising, frequent gum bleeding or nose bleeding;  + anemia            Dermatologic: The patient reports no recent rashes or chronic skin disorders            Rheumatologic: The patient reports no history of chronic arthritis, myalgias, arthralgias            Genitourinary:  + CKD           Psychiatric:  + depression, anxiety           Oncologic: + prostate cancer            ENT: The patient reports no hoarseness of voice, hearing loss, sinus congestion, tinnitus           Neurologic: The patient reports no history of seizure, stroke, or frequent headaches  PE: BP (!) 165/79 (BP Location: Left arm)   Pulse 74   Temp 97.7 °F (36.5 °C) (Temporal)   Resp 20   Ht 5' 6\" (1.676 m)   Wt 155 lb (70.3 kg)   SpO2 98%   BMI 25.02 kg/m²   Gen: AAO x 3, able to speak in complete sentences; hard of hearing   HENT: EOMI, PERRL, oropharynx is clear with moist mucosal membranes  Eyes:  Sclerae are anicteric  Neck:  Supple without nuchal rigidity  CV: Regular rate and rhythm, with normal S1 and S2  Resp: Clear to auscultation bilaterally without wheezes; rubs, rhonchi, or rales  Abdomen: Soft, non-tender, non-distended with the presence of bowel sounds; No hepatosplenomegaly; no rebound or guarding; No ascites is clinically apparent; no tympany to percussion  Ext: No peripheral edema or cyanosis  Skin: Warm and dry  Psychiatric: Appropriate mood and congruent affect without obvious depression or anxiety  Labs:   Lab Results   Component Value Date    WBC 3.3 07/23/2024    HGB 10.1 07/23/2024    HCT 29.7 07/23/2024    .0 07/23/2024    CREATSERUM 1.88 07/23/2024    BUN 17 07/23/2024     07/23/2024    K 3.8 07/23/2024     07/23/2024    CO2 26.0 07/23/2024    GLU 90 07/23/2024    CA 8.4 07/23/2024    ALB 2.9 07/22/2024    ALKPHO 39 07/22/2024    BILT 0.5 07/22/2024    AST 36 07/22/2024    ALT 34 07/22/2024    INR 0.99 07/23/2024    PTP 13.1 07/23/2024     Recent Labs   Lab 07/22/24  1437 07/23/24  0651   * 90   BUN 22 17   CREATSERUM 2.19* 1.88*   CA 9.0 8.4*    144   K 3.2* 3.8    115*   CO2 24.0 26.0     Recent Labs   Lab 07/23/24  0651   RBC 3.03*   HGB 10.1*   HCT 29.7*   MCV 98.0   MCH 33.3   MCHC 34.0   RDW 14.8   NEPRELIM 1.77   WBC 3.3*   .0*       Recent Labs   Lab 07/22/24  1437   ALT 34   AST 36       Impression: 89 yr-old male with PMhx of CAD s/p PCI with stents (Plavix + ASA--last dose 7/22), HF, ANN, RA, CKD, and prostate cancer with bone mets admitted overnight with weakness, fatigue found to have anemia with occult + stool--Hgb 11.1 (MCV 99; sat 23, ferritin 100) from Hgb 12-14. No GI symptoms and he denies overt GI bleeding. Discussed diagnostic bi-directional endoscopy with pt versus holding Plavix x 5 days and completing bi-directional endoscopy however pt currently not interested in any procedures   Will monitor for overt GI bleeding,  treat with PPI for possible PUD, and plan to discuss again potential endoscopic eval to assess for possible sources of bleeding including AVMs, ulcers, polyps, IBD, and neoplasm   Recommendations:     Protonix daily  Monitor for overt GI bleeding; NO need for occult stool testing   Trial of a clear liquid diet--if no bleeding then OK to advance to regular diet  Discussed diagnostic bi-directional endoscopy with pt versus holding Plavix x 5 days and completing bi-directional endoscopy--pt currently not agreeable in procedures     Thank you for the consultation, we will follow the patient with you.  Attending addendum (Dr DENISE John) to follow later today and provide formal, final recommendations at that time   NING Albarran  10:41 AM  7/23/2024  [facility]  [phone]    GI attending addendum:    I have personally seen and examined this patient and agree with above nurse practitioner's assessment and recommendations.     Briefly, patient is an 89-year-old male with chronic medical conditions including coronary artery disease status post stenting on aspirin and Plavix, heart failure, and prostate cancer with bone mets who was admitted overnight with weakness and found to be anemic.  No overt bleeding noted.  We were consulted for the anemia.  Believes last colonoscopy was about 8 years ago and EGD over 30 years ago.  He is currently on Plavix.  Hemoglobin Eschen was 11 down from his prior baseline of 13 which downtrended this morning to 10.1.  Repeat hemoglobin this evening was 10.2.  On physical exam, patient was resting comfortably in bed.  His abdomen is soft, nontender, and nondistended.  Patient with acute anemia without any overt signs of GI bleeding.  There is minimal iron deficiency.  Offered inpatient EGD and colonoscopy for patient, but recommend Plavix be held for 5 days prior to any intervention.  At this time, patient is overall feeling well and has not seen any further overt  bleeding and hemoglobin has stabilized.  He would like to perform procedures as outpatient.  Advised this is reasonable given her stable hemoglobin and no overt bleeding.  Advised he may continue Plavix and we will follow-up in clinic and arrange for outpatient EGD and colonoscopy if still wanted by patient.  Given this, patient is okay for discharge from GI perspective.  Follow-up with nurse practitioner or Dr. Yosef John in 2 to 3 weeks.  Thank you for the consultation.    Mich John DO  9:56 PM  7/23/2024  [facility] Gastroenterology  [phone]

## 2024-07-23 NOTE — PHYSICAL THERAPY NOTE
PHYSICAL THERAPY EVALUATION - INPATIENT     Room Number: 407/407-A  Evaluation Date: 7/23/2024  Type of Evaluation: Initial  Physician Order: PT Eval and Treat    Presenting Problem: GI hemorrhage, hypokalemia, generalized weakness  Co-Morbidities : CAD s/p stents, CKDIV, HTN, chronic diastolic heart failure, hypothyroidism, h/o prostate CA  Reason for Therapy: Mobility Dysfunction and Discharge Planning    PHYSICAL THERAPY ASSESSMENT   Patient is currently functioning below baseline with bed mobility, transfers, and gait.  Prior to admission, patient's baseline is independent ambulation.  Patient is requiring minimal assist as a result of the following impairments: decreased functional strength, decreased endurance/aerobic capacity, impaired sitting and standing balance, and decreased muscular endurance. Physical therapy will continue to follow for duration of hospitalization.      Patient will benefit from continued skilled PT Services at discharge to promote prior level of function and safety with additional support and return home with home health PT.    EQUIPMENT RECOMMENDATIONS  Rolling walker    PLAN  PT Treatment Plan: Bed mobility;Endurance;Patient education;Gait training;Neuromuscular re-educate;Stair training;Transfer training  Rehab Potential : Good  Frequency (Obs): 3-5x/week  Number of Visits to Meet Established Goals: 3      CURRENT GOALS    Goal #1 Patient is able to demonstrate supine - sit EOB @ level: modified independent     Goal #2 Patient is able to demonstrate transfers Sit to/from Stand at assistance level: modified independent     Goal #3 Patient is able to ambulate 150 feet with assist device: walker - rolling at assistance level: supervision     Goal #4    Goal #5    Goal #6    Goal Comments: Goals established on 7/23/2024      PHYSICAL THERAPY MEDICAL/SOCIAL HISTORY  History related to current admission: Patient is a 89 year old male who presented to the ED on 7/22/2024 from home for  weakness.  Pt diagnosed with GI hemorrhage, weakness, and hypokalemia.      HOME SITUATION  Type of Home: House   Home Layout: One level  Stairs to Enter : 1  Railing: No          Lives With: Spouse  Drives: Yes  Patient Owned Equipment: Cane;Rolling walker  Patient Regularly Uses: Glasses;Hearing aides (does not use hearing aides)    Prior Level of Wallace: The pt reports that he is typically independent with ambulation and ADL's.  Pt and his wife share homemaking activities.  Pt denies any recent falls.  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    SUBJECTIVE  \"I feel helpless here with all the wires and bed.\"      OBJECTIVE  Precautions: Bed/chair alarm  Fall Risk: High fall risk    WEIGHT BEARING RESTRICTION  Weight Bearing Restriction: None                PAIN ASSESSMENT  Ratin  Location: buttocks  Management Techniques: Repositioning;Activity promotion    COGNITION  Overall Cognitive Status:  WNL  Arousal/Alertness:  appropriate responses to stimuli  Orientation Level:  oriented x4  Memory:  appears intact  Following Commands:  follows all commands and directions without difficulty    RANGE OF MOTION AND STRENGTH ASSESSMENT  Upper extremity ROM and strength are within functional limits     Lower extremity ROM is within functional limits     Lower extremity strength is within functional limits, grossly 4+/5      BALANCE  Static Sitting: Fair +  Dynamic Sitting: Fair -  Static Standing: Poor +  Dynamic Standing: Poor +      ACTIVITY TOLERANCE  Pulse: 74  Heart Rate Source: Monitor                   O2 WALK  Oxygen Therapy  SPO2% on Room Air at Rest: 97        AM-PAC '6-Clicks' INPATIENT SHORT FORM - BASIC MOBILITY  How much difficulty does the patient currently have...  Patient Difficulty: Turning over in bed (including adjusting bedclothes, sheets and blankets)?: A Little   Patient Difficulty: Sitting down on and standing up from a chair with arms (e.g.,  wheelchair, bedside commode, etc.): A Little   Patient Difficulty: Moving from lying on back to sitting on the side of the bed?: A Little   How much help from another person does the patient currently need...   Help from Another: Moving to and from a bed to a chair (including a wheelchair)?: A Little   Help from Another: Need to walk in hospital room?: A Little   Help from Another: Climbing 3-5 steps with a railing?: A Little       AM-PAC Score:  Raw Score: 18   Approx Degree of Impairment: 46.58%   Standardized Score (AM-PAC Scale): 43.63   CMS Modifier (G-Code): CK    FUNCTIONAL ABILITY STATUS  Gait Assessment   Functional Mobility/Gait Assessment  Gait Assistance: Supervision;Minimum assistance  Distance (ft): 200  Assistive Device: None;Rolling walker  Pattern:  (Without use of device: hands in low guard, reaching for support of wall and furniture; With use of a RW: step through gait)    Skilled Therapy Provided     Bed Mobility:  Rolling: SBA  Supine to sit: SBA   Sit to supine: NT     Transfer Mobility:  Sit to stand: CGA, with verbal cues for hand placement and sequencing to RW  Stand to sit: CGA, with verbal cues for hand placement and sequencing from RW  Gait = CGA progressing to SBA with use of a RW, Min A without device    Therapist's Comments: Pt ambulated within room sans device.  Pt utilized short shuffling steps and held hands in low guard or reached for support for door or wall.  Pt provided a RW for hallway ambulation and exhibited improved stability and increased gait speed.  Pt required 3 standing rest breaks, due to shortness of breath, within 150 feet.    Exercise/Education Provided:  Bed mobility  Functional activity tolerated  Gait training  Neuromuscular re-educate  Strengthening  Transfer training    Patient End of Session: Up in chair;Needs met;Call light within reach;RN aware of session/findings;All patient questions and concerns addressed;Alarm set      Patient Evaluation Complexity  Level:  History High - 3 or more personal factors and/or co-morbidities   Examination of body systems High - addressing a total of 4 or more elements   Clinical Presentation Low- Stable   Clinical Decision Making Low Complexity       PT Session Time: 30 minutes  Gait Training: 10 minutes

## 2024-07-23 NOTE — PROGRESS NOTES
[facility]   part of [facility]     Hospitalist Progress Note     Agustin Pardo Patient Status:  Observation    1935 MRN [id number]   Location [facility] 4NW-A Attending Chevy Quiles,    Hosp Day # 0 PCP RORO SMILEY     Chief Complaint: generalized weakness    Subjective:     Patient resting in bed comfortably, denies any abdominal pain. Hoping to have scope done soon as he is hungry and wants to eat    Objective:    Review of Systems:   A comprehensive review of systems was completed; pertinent positive and negatives stated in subjective.    Vital signs:  Temp:  [97.8 °F (36.6 °C)-98.9 °F (37.2 °C)] 98.9 °F (37.2 °C)  Pulse:  [72-92] 79  Resp:  [15-18] 18  BP: ()/(58-91) 125/72  SpO2:  [95 %-100 %] 98 %    Physical Exam:    General: No acute distress  Respiratory: No wheezes, no rhonchi  Cardiovascular: S1, S2, regular rate and rhythm  Abdomen: Soft, Non-tender, non-distended, positive bowel sounds  Neuro: No new focal deficits.   Extremities: No edema    Diagnostic Data:    Labs:  Recent Labs   Lab 24  1437 24  0651   WBC 3.8* 3.3*   HGB 11.1* 10.1*   MCV 99.1 98.0   .0 134.0*   INR  --  0.99       Recent Labs   Lab 24  1437   *   BUN 22   CREATSERUM 2.19*   CA 9.0   ALB 2.9*      K 3.2*      CO2 24.0   ALKPHO 39*   AST 36   ALT 34   BILT 0.5   TP 6.1*       Estimated Creatinine Clearance: 21.4 mL/min (A) (based on SCr of 2.19 mg/dL (H)).    No results for input(s): \"TROP\", \"TROPHS\", \"CK\" in the last 168 hours.    Recent Labs   Lab 24  0651   PTP 13.1   INR 0.99        Microbiology    No results found for this visit on 24.      Imaging: Reviewed in Epic.    Medications:    pantoprazole  40 mg Intravenous Q12H    aspirin  81 mg Oral QOD    dorzolamide  1 drop Both Eyes TID    cyanocobalamin  1,000 mcg Oral Nightly    ezetimibe  10 mg Oral Nightly    fluticasone propionate  2 spray Each Nare Daily    gabapentin  100 mg Oral TID     cetirizine  10 mg Oral Daily    levothyroxine  88 mcg Oral Daily @ 0700    [Held by provider] Netarsudil-Latanoprost  1 drop Both Eyes Nightly    predniSONE  5 mg Oral Daily    rosuvastatin  40 mg Oral Nightly    traZODone  50 mg Oral Nightly       Assessment & Plan:      #Acute blood loss anemia; suspected GIB  -GI consulted from the ER  -Monitor hemoglobin and transfuse if under 7  -IV PPI BID  -NPO in case of scoping today     #Fatigue and generalized weakness likely related to above  -PT/OT     #CAD s/p PCI  -Continue aspirin  -Hold Plavix     #Dyslipidemia  -Statin, Zetia     #Chronic diastolic heart failure  -Appears compensated  -Resume home cardiac regimen     #GERD  -PPI as above     #Glaucoma  -Resume home eyedrops     #Hypothyroidism  -Hypothyroidism  -Check TSH     #CKD stage IV  -Near baseline     #Hx of prostate cancer  -Hold Abiraterone      Sarah De La Garza,     Supplementary Documentation:     Quality:  DVT Mechanical Prophylaxis:   SCDs,    DVT Pharmacologic Prophylaxis   Medication   None      DVT Pharmacologic prophylaxis: Aspirin 162 mg         Code Status: Prior  Rodriguez: No urinary catheter in place  Rodriguez Duration (in days):   Central line:    SYLVIA:     Discharge is dependent on: clinical state  At this point Mr. Pardo is expected to be discharge to: home    The 21st Century Cures Act makes medical notes like these available to patients in the interest of transparency. Please be advised this is a medical document. Medical documents are intended to carry relevant information, facts as evident, and the clinical opinion of the practitioner. The medical note is intended as peer to peer communication and may appear blunt or direct. It is written in medical language and may contain abbreviations or verbiage that are unfamiliar.

## 2024-07-23 NOTE — H&P
[place] HOSPITALIST  History and Physical     Agustin Pardo Patient Status:  Emergency    1935 MRN [id number]   Location [place] [facility] DEPARTMENT IN [place] Attending Chino Bautista MD   Hosp Day # 0 PCP RORO SMILEY     Chief Complaint: Pale appearance, generalized weakness    Subjective:    History of Present Illness:   Agustin Pardo is a 89 year old male with PMHx CAD s/p PCI, chronic diastolic heart failure, BPH, GERD, glaucoma, depression, hypothyroidism and CKD stage IV who presents to the hospital with fatigue and generalized weakness.  He called his PCP and was advised to come to the ER.  His daughter noticed over the weekend that he looked pale.  She was worried about anemia because patient does have a history of iron deficiency in the past.  He denies any fever, chills, chest pain, shortness of breath, lightheadedness or dizziness.  He denies any nausea, vomiting, melena or hematochezia.  He had a loose bowel movement today after having milk of magnesia as he was dealing with constipation earlier in the week.  Hemoglobin is 11 down from a baseline of around 13.    History/Other:    Past Medical History:  Past Medical History:    Abdominal hernia    Anxiety state, unspecified    Arthritis    Back pain    Cancer (HCC)    kidney CA , BONES, PROSTATE    Chronic rhinitis    Constipation    CORONARY ARTERY DISEASE    Coronary atherosclerosis    Depression    Diarrhea, unspecified    Dizziness    Endocrine disorder    Esophageal reflux    Frequent use of laxatives    Frequent UTI    Glaucoma    Heart attack (HCC)    x5    Hemorrhoids    High blood pressure    High cholesterol    History of angioplasty    History of kidney cancer    Hypothyroid    Itch of skin    Kidney problem    missing right kidney    Loss of appetite    Nausea    Night sweats    Osteoporosis    Other and unspecified hyperlipidemia    RA (rheumatoid arthritis) (HCC)    Stented coronary artery    Stress    Uncomfortable  fullness after meals    Unspecified disorder of thyroid    Unspecified essential hypertension    Unspecified sleep apnea    no CPAP    Visual impairment    Vitamin B 12 deficiency    Vomiting     Past Surgical History:   Past Surgical History:   Procedure Laterality Date    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    Angiogram      Angioplasty (coronary)      4 stents to LAD    Appendectomy      Appendectomy      Cath percutaneous  transluminal coronary angioplasty      Cholecystectomy      Heart surgery      Laparoscopic cholecystectomy N/A 03/25/2015    Procedure: LAPAROSCOPIC CHOLECYSTECTOMY;  Surgeon: Preston Weber DO;  Location: [facility] MAIN OR    Laparoscopy, surgical; nephrectomy      Right Kidney    Other surgical history Right 1996    nephrectomy    Other surgical history      vasectomy    Removal gallbladder      Surgical stent  2001, 2/2013    coronary    Tonsillectomy      Vasectomy  1971      Family History:   Family History   Problem Relation Age of Onset    Heart Disorder Father     Other (Other) Father     Other (pulmonary embolism) Father      Social History:    reports that he quit smoking about 47 years ago. His smoking use included cigarettes. He started smoking about 47 years ago. He has never used smokeless tobacco. He reports that he does not drink alcohol and does not use drugs.     Allergies:   Allergies   Allergen Reactions    Levaquin [Levofloxacin] Myopathy     THINKS IT INTERFERED WITH MY MUSCLE ENZYMES    Aspirin OTHER (SEE COMMENTS) and TINITUS     RINGING IN EARS     Medications:    Current Facility-Administered Medications on File Prior to Encounter   Medication Dose Route Frequency Provider Last Rate Last Admin    [COMPLETED] Tetanus-Diphth-Acell Pertussis (Tdap) (Boostrix) injection 0.5 mL  0.5 mL Intramuscular Once Francisco Adkins PA-C   0.5 mL at 06/10/24 2123    [COMPLETED] acetaminophen (Tylenol Extra Strength) tab 1,000 mg  1,000 mg Oral Once Francisco Adkins PA-C   1,000 mg  at 06/10/24 0339     Current Outpatient Medications on File Prior to Encounter   Medication Sig Dispense Refill    HYDROcodone-acetaminophen 5-325 MG Oral Tab Take 1-2 tablets by mouth every 6 (six) hours as needed for Pain. 15 tablet 0    levothyroxine 88 MCG Oral Tab Take 1 tablet (88 mcg total) by mouth before breakfast.      tamsulosin 0.4 MG Oral Cap Take 1 capsule (0.4 mg total) by mouth at bedtime.      Calcium Carb-Cholecalciferol (CALCIUM 500 + D3 OR) Take by mouth.      Abiraterone Acetate 250 MG Oral Tab Take 1,000 mg by mouth daily.        Brinzolamide 1 % Ophthalmic Suspension Place 1 drop into both eyes in the morning and 1 drop before bedtime.      Fluticasone Propionate 50 MCG/ACT Nasal Suspension 2 sprays by Each Nare route daily.      Multiple Vitamins-Minerals (OCUVITE EXTRA) Oral Tab Take 1 tablet by mouth daily.        predniSONE 5 MG Oral Tab Take 1 tablet (5 mg total) by mouth daily.      Rosuvastatin Calcium 20 MG Oral Tab Take 1 tablet (20 mg total) by mouth nightly.      traZODone HCl 50 MG Oral Tab Take 1 tablet (50 mg total) by mouth nightly.      Netarsudil-Latanoprost (ROCKLATAN) 0.02-0.005 % Ophthalmic Solution Place 1 drop into both eyes nightly.      Simethicone 180 MG Oral Cap Take 1 capsule by mouth daily as needed.      Coenzyme Q10 (COQ-10) 200 MG Oral Cap Take 1 capsule by mouth daily.      Clopidogrel Bisulfate (PLAVIX) 75 MG Oral Tab Take 1 tablet (75 mg total) by mouth daily.      Metoprolol Succinate ER (TOPROL XL) 25 MG Oral Tablet 24 Hr Take 1 tablet by mouth Daily Beta Blocker. (Patient taking differently: Take 1 tablet (25 mg total) by mouth daily.) 30 tablet 3    Citalopram Hydrobromide (CELEXA) 40 MG Oral Tab Take 1 tablet (40 mg total) by mouth daily.      levocetirizine 5 MG Oral Tab Take 1 tablet (5 mg total) by mouth daily.      ezetimibe (ZETIA) 10 MG Oral Tab Take 1 tablet (10 mg total) by mouth nightly.      aspirin 81 MG Oral Tab Take 1 tablet (81 mg total) by  mouth every other day. Take one tab every other day      Cyanocobalamin (VITAMIN B-12 OR) Take 1,000 mg by mouth nightly. One tab daily       Review of Systems:   A comprehensive review of systems was completed.    Pertinent positives and negatives noted in the HPI.    Objective:   Physical Exam:    BP (!) 166/85   Pulse 72   Temp 97.9 °F (36.6 °C) (Temporal)   Resp 16   Ht 5' 6\" (1.676 m)   Wt 155 lb (70.3 kg)   SpO2 100%   BMI 25.02 kg/m²   General: No acute distress, awake and alert  Respiratory: No rhonchi, no wheezes  Cardiovascular: S1, S2. Regular rate and rhythm  Abdomen: Soft, Non-tender, non-distended, positive bowel sounds  Neuro: APONTE x 4  Extremities: No edema    Results:    Labs:      Labs Last 24 Hours:  Recent Labs   Lab 07/22/24  1437   RBC 3.37*   HGB 11.1*   HCT 33.4*   MCV 99.1   MCH 32.9   MCHC 33.2   RDW 15.0   NEPRELIM 2.21   WBC 3.8*   .0     Recent Labs   Lab 07/22/24  1437   *   BUN 22   CREATSERUM 2.19*   EGFRCR 28*   CA 9.0   ALB 2.9*      K 3.2*      CO2 24.0   ALKPHO 39*   AST 36   ALT 34   BILT 0.5   TP 6.1*     Lab Results   Component Value Date    PT 12.7 07/07/2014    INR 0.92 01/23/2021    INR 0.94 03/16/2015    INR 0.96 07/07/2014     No results for input(s): \"TROP\", \"TROPHS\", \"CK\" in the last 168 hours.    No results for input(s): \"TROP\", \"PBNP\" in the last 168 hours.    No results for input(s): \"PCT\" in the last 168 hours.    Imaging: Imaging data reviewed in Epic.    Assessment & Plan:      #Acute anemia  -GI consulted from the ER  -Check iron studies, vitamin B12 and folic acid  -Monitor hemoglobin and transfuse if under 7  -IV PPI BID  -CLD, NPO at midnight    #Fatigue and generalized weakness likely related to above  -PT/OT    #CAD s/p PCI  -Continue aspirin  -Hold Plavix    #Dyslipidemia  -Statin, Zetia    #Chronic diastolic heart failure  -Appears compensated  -Resume home cardiac regimen    #GERD  -PPI as above    #Glaucoma  -Resume home  eyedrops    #Hypothyroidism  -Hypothyroidism  -Check TSH    #CKD stage IV  -Near baseline    #Hx of prostate cancer  -Hold Abiraterone      Plan of care discussed with patient, RN.    Cehvy Quiles, DO    Supplementary Documentation:     The 21st Century Cures Act makes medical notes like these available to patients in the interest of transparency. Please be advised this is a medical document. Medical documents are intended to carry relevant information, facts as evident, and the clinical opinion of the practitioner. The medical note is intended as peer to peer communication and may appear blunt or direct. It is written in medical language and may contain abbreviations or verbiage that are unfamiliar.

## 2024-07-23 NOTE — OCCUPATIONAL THERAPY NOTE
OCCUPATIONAL THERAPY EVALUATION - INPATIENT     Room Number: 407/407-A  Evaluation Date: 7/23/2024  Type of Evaluation: Initial  Presenting Problem: GIB, weakness, hypokalemia, fatigue    Physician Order: IP Consult to Occupational Therapy  Reason for Therapy: ADL/IADL Dysfunction and Discharge Planning    OCCUPATIONAL THERAPY ASSESSMENT   Patient is currently functioning near baseline with toileting, bathing, upper body dressing, lower body dressing, bed mobility, and transfers. Prior to admission, patient's baseline is mod I with BADL without a device.  Patient is requiring moderate assistance as a result of the following impairments: decreased functional strength, decreased functional reach, decreased endurance, and impaired standing  balance. Occupational Therapy will continue to follow for duration of hospitalization.    Patient will benefit from continued skilled OT Services at discharge to promote prior level of function.  Anticipate patient will return home with home health OT      History Related to Current Admission: Patient is a 89 year old male admitted on 7/22/2024 with Presenting Problem: GIB, weakness, hypokalemia, fatigue. Co-Morbidities : CAD s/p stents, CKDIV, HTN, chronic diastolic heart failure, hypothyroidism, h/o prostate CA    WEIGHT BEARING RESTRICTION  Weight Bearing Restriction: None                Recommendations for nursing staff:   Transfers: CGA with RW  Toileting location: toilet     EVALUATION SESSION:  Patient Start of Session: Pt was found in his bed.         FUNCTIONAL TRANSFER ASSESSMENT  Sit to Stand: Edge of Bed  Edge of Bed: Contact Guard Assist  Toilet Transfer: Contact Guard Assist    BED MOBILITY  Supine to Sit : Minimal Assist    BALANCE ASSESSMENT     FUNCTIONAL ADL ASSESSMENT  LB Dressing Seated: Moderate Assist  LB Dressing Standing: Moderate Assist  Toileting Seated: Supervision  Toileting Standing: Maximum Assist      ACTIVITY TOLERANCE:                          O2  SATURATIONS       COGNITION  Overall Cognitive Status:  WFL - within functional limits    Upper Extremity   ROM: within functional limits   Strength: within functional limits       EDUCATION PROVIDED  Patient: Role of Occupational Therapy; Discharge Recommendations; Plan of Care; Functional Transfer Techniques; Fall Prevention; Compensatory ADL Techniques  Patient's Response to Education: Verbalized Understanding; Requires Further Education    Equipment used: RW, gait belt   Demonstrates functional use, Would benefit from additional trial      Therapist comments: supine>sit EOB>stand to RW>walk to bathroom>toileting>extended time given to charli-care as pt had a large BM>depends management>walk within hallway>walk to sit in chair     Patient End of Session: Up in chair;Needs met;Call light within reach;RN aware of session/findings;All patient questions and concerns addressed;Alarm set    OCCUPATIONAL PROFILE    HOME SITUATION  Type of Home: House  Home Layout: One level  Lives With: Spouse    Toilet and Equipment: Standard height toilet  Shower/Tub and Equipment: Tub only  Other Equipment: Other (Comment) (RW, cane)    Occupation/Status: Retired  Hand Dominance: Right  Drives: Yes  Patient Regularly Uses: Glasses;Hearing aides (does not use hearing aides)    Prior Level of Function: Pt lives with his wife. Pt is typically mod I with all BADL without the use of a device. Pt did have one recent fall. Pt's wife assists with groceries and meals as needed.     SUBJECTIVE   \"I'd like to just lay here until I get that swallow test.\"     PAIN ASSESSMENT  Ratin  Location: buttocks  Management Techniques: Activity promotion;Relaxation;Repositioning    OBJECTIVE  Precautions: Bed/chair alarm  Fall Risk: High fall risk      ASSESSMENTS    AM-PAC ‘6-Clicks’ Inpatient Daily Activity Short Form  -   Putting on and taking off regular lower body clothing?: A Little  -   Bathing (including washing, rinsing, drying)?: A Lot  -    Toileting, which includes using toilet, bedpan or urinal? : A Lot  -   Putting on and taking off regular upper body clothing?: A Little  -   Taking care of personal grooming such as brushing teeth?: A Little  -   Eating meals?: None    AM-PAC Score:  Score: 17  Approx Degree of Impairment: 50.11%  Standardized Score (AM-PAC Scale): 37.26    ADDITIONAL TESTS     NEUROLOGICAL FINDINGS      COGNITION ASSESSMENTS       PLAN  OT Treatment Plan: Balance activities;Energy conservation/work simplification techniques;ADL training;Functional transfer training;Endurance training;Cognitive reorientation;Patient/Family education;Patient/Family training;Equipment eval/education;Compensatory technique education;Continued evaluation  Rehab Potential : Fair  Frequency: 3x/week  Number of Visits to Meet Established Goals: 3    ADL Goals   Patient will perform all ADLs: with supervision    Functional Transfer Goals  Patient will perform all functional transfers:  with supervision      Patient Evaluation Complexity Level:   Occupational Profile/Medical History MODERATE - Expanded review of history including review of medical or therapy record   Specific performance deficits impacting engagement in ADL/IADL MODERATE  3 - 5 performance deficits   Client Assessment/Performance Deficits MODERATE - Comorbidities and min to mod modifications of tasks    Clinical Decision Making MODERATE - Analysis of occupational profile, detailed assessments, several treatment options    Overall Complexity MODERATE     OT Session Time: 30 minutes  Self-Care Home Management: 15 minutes

## 2024-07-23 NOTE — PLAN OF CARE
Received pt alert and orientated x4. [place]. BP elevated, Dr De La Garza aware. See MAR. No sob on RA. Afebrile. No c/o pain. Worked with PT. Sitting up in the chair. Up and using the bathroom. No signs of bleeding. Tolerating diet. All meds given per MAR. Wife updated on POC. Fall precautions in place, call light within reach.     1900: Hbg 10.2. Both Dr De La Garza and Dr. John are okay with pt leaving. Pt informed that he needs to make a follow up with Dr. Rios. Pt cleared for DC. IV removed. DC paperwork provided, wife and pt verbalized understanding. All pt belongings gathered and sent with the pt.     NURSING DISCHARGE NOTE    Discharged Home via Wheelchair.  Accompanied by  PCT  Belongings Taken by patient/family.    Problem: ANEMIA OF PREMATURITY  Goal: Hematocrit/Hemoblobin within normal range  Description: Interventions:  - Monitor CBC as ordered  - Administer Iron and nutrition supplements as ordered  - Administer epoetin shannon as ordered  - Administer blood products as ordered  - Educate parent/family on condition and treatment plan  Outcome: Progressing

## 2024-07-30 NOTE — DISCHARGE SUMMARY
[facility]     Agustin Pardo Patient Status:  Observation    1935 MRN [id number]   Location [facility] 4NW-A Attending Alicia Huntley MD   Hosp Day # 0 PCP RORO SMILEY     Date of Admission: 2024  Date of Discharge:  2024   Discharge Disposition: Home Health Care Services    Discharge Diagnosis:  #Acute anemia  #Fatigue and generalized weakness likely related to above  #CAD s/p PCI  #Dyslipidemia  #Chronic diastolic heart failure  #GERD  #Glaucoma  #Hypothyroidism  #CKD stage IV  #Hx of prostate cancer    History of Present Illness: (per Dr. Quiles) Agustin Pardo is a 89 year old male with PMHx CAD s/p PCI, chronic diastolic heart failure, BPH, GERD, glaucoma, depression, hypothyroidism and CKD stage IV who presents to the hospital with fatigue and generalized weakness.  He called his PCP and was advised to come to the ER.  His daughter noticed over the weekend that he looked pale.  She was worried about anemia because patient does have a history of iron deficiency in the past.  He denies any fever, chills, chest pain, shortness of breath, lightheadedness or dizziness.  He denies any nausea, vomiting, melena or hematochezia.  He had a loose bowel movement today after having milk of magnesia as he was dealing with constipation earlier in the week.  Hemoglobin is 11 down from a baseline of around 13.     Brief Synopsis: admitted with generalized weakness and found to have acute anemia. Placed on PPI drip and GI was consulted. He did not experience any e/o active bleeding during his admission and his repeat Hgb remained stable. His plavix was held and he was discharged to home in stable clinical condition with plan for outpt EGD/colonoscopy after plavix held for 5 days. He was discharged to home on PPI and recommendation to f/u closely with GI and PCP in outpt setting.     Lace+ Score: 82  59-90 High Risk  29-58 Medium Risk  0-28   Low Risk       TCM Follow-Up  Recommendation:  LACE > 58: High Risk of readmission after discharge from the hospital.      Procedures during hospitalization:   none    Incidental or significant findings and recommendations (brief descriptions):  As above    Lab/Test results pending at Discharge:   none    Consultants:  GI    Discharge Medication List:     Discharge Medications        START taking these medications        Instructions Prescription details   pantoprazole 40 MG Tbec  Commonly known as: Protonix      Take 1 tablet (40 mg total) by mouth every morning before breakfast.   Quantity: 30 tablet  Refills: 0            CONTINUE taking these medications        Instructions Prescription details   Abiraterone Acetate 250 MG Tabs      Take 1,000 mg by mouth daily.   Refills: 0     aspirin 81 MG Tabs      Take 1 tablet (81 mg total) by mouth every other day. Take one tab every other day   Refills: 0     brinzolamide 1 % Susp  Commonly known as: Azopt      Place 1 drop into both eyes in the morning and 1 drop before bedtime.   Refills: 0     CALCIUM 500 + D3 OR      Take by mouth.   Refills: 0     citalopram 40 MG Tabs  Commonly known as: CeleXA      Take 1 tablet (40 mg total) by mouth daily.   Refills: 0     clopidogrel 75 MG Tabs  Commonly known as: Plavix      Take 1 tablet (75 mg total) by mouth daily.   Refills: 0     CoQ-10 200 MG Caps      Take 1 capsule by mouth daily.   Refills: 0     ezetimibe 10 MG Tabs  Commonly known as: Zetia      Take 1 tablet (10 mg total) by mouth nightly.   Refills: 0     fluticasone propionate 50 MCG/ACT Susp  Commonly known as: Flonase      2 sprays by Each Nare route daily.   Refills: 0     furosemide 20 MG Tabs  Commonly known as: Lasix      Take 1 tablet (20 mg total) by mouth as needed (pt takes Mondays and Thursdays).   Refills: 0     gabapentin 100 MG Caps  Commonly known as: Neurontin      Take 1 capsule (100 mg total) by mouth 3 (three) times daily. 100 mg in the morning and 200 mg at night    Refills: 0     HYDROcodone-acetaminophen 5-325 MG Tabs  Commonly known as: Norco      Take 1-2 tablets by mouth every 6 (six) hours as needed for Pain.   Quantity: 15 tablet  Refills: 0     levocetirizine 5 MG Tabs  Commonly known as: Xyzal      Take 1 tablet (5 mg total) by mouth daily.   Refills: 0     levothyroxine 88 MCG Tabs  Commonly known as: Synthroid      Take 1 tablet (88 mcg total) by mouth before breakfast.   Refills: 0     Ocuvite Extra Tabs      Take 1 tablet by mouth daily.   Refills: 0     predniSONE 5 MG Tabs  Commonly known as: Deltasone      Take 1 tablet (5 mg total) by mouth daily.   Refills: 0     Rocklatan 0.02-0.005 % Soln  Generic drug: Netarsudil-Latanoprost      Place 1 drop into both eyes nightly.   Refills: 0     rosuvastatin 20 MG Tabs  Commonly known as: Crestor      Take 2 tablets (40 mg total) by mouth nightly.   Refills: 0     Simethicone 180 MG Caps      Take 1 capsule by mouth daily as needed.   Refills: 0     traZODone 50 MG Tabs  Commonly known as: Desyrel      Take 1 tablet (50 mg total) by mouth nightly.   Refills: 0     VITAMIN B-12 OR      Take 1,000 mg by mouth nightly. One tab daily   Refills: 0            STOP taking these medications      metoprolol succinate ER 25 MG Tb24  Commonly known as: Toprol XL        tamsulosin 0.4 MG Caps  Commonly known as: Flomax                  Where to Get Your Medications        Please  your prescriptions at the location directed by your doctor or nurse    Bring a paper prescription for each of these medications  [facility]         [place] reviewed[place]    Follow-up appointment:   Mich John DO  [address] [place] [address] [phone]    Follow up      [facility] [address]  [facility] [address] [phone]    Follow up      Appointments for Next 30 Days 7/29/2024 - 8/28/2024      None            Vital signs:   /83 (BP Location: Left arm)   Pulse 74   Temp 98.6 °F (37 °C)  (Oral)   Resp 18   Ht 5' 6\" (1.676 m)   Wt 155 lb (70.3 kg)   SpO2 98%   BMI 25.02 kg/m²       Physical Exam:    General: No acute distress   Lungs: clear to auscultation  Cardiovascular: S1, S2  Abdomen: Soft      -----------------------------------------------------------------------------------------------  PATIENT DISCHARGE INSTRUCTIONS: See electronic chart    Sarah De La Garza,     Total time spent on discharge plannin minutes     The  [place] Cures Act makes medical notes like these available to patients in the interest of transparency. Please be advised this is a medical document. Medical documents are intended to carry relevant information, facts as evident, and the clinical opinion of the practitioner. The medical note is intended as peer to peer communication and may appear blunt or direct. It is written in medical language and may contain abbreviations or verbiage that are unfamiliar.

## 2024-12-28 ENCOUNTER — APPOINTMENT (OUTPATIENT)
Dept: GENERAL RADIOLOGY | Age: 89
End: 2024-12-28
Attending: STUDENT IN AN ORGANIZED HEALTH CARE EDUCATION/TRAINING PROGRAM
Payer: MEDICARE

## 2024-12-28 ENCOUNTER — HOSPITAL ENCOUNTER (EMERGENCY)
Age: 89
Discharge: HOME | End: 2024-12-28
Attending: STUDENT IN AN ORGANIZED HEALTH CARE EDUCATION/TRAINING PROGRAM
Payer: MEDICARE

## 2024-12-28 VITALS
OXYGEN SATURATION: 95 % | HEART RATE: 58 BPM | DIASTOLIC BLOOD PRESSURE: 87 MMHG | BODY MASS INDEX: 24.91 KG/M2 | RESPIRATION RATE: 18 BRPM | SYSTOLIC BLOOD PRESSURE: 166 MMHG | WEIGHT: 155 LBS | TEMPERATURE: 98 F | HEIGHT: 66 IN

## 2024-12-28 DIAGNOSIS — S22.32XA CLOSED FRACTURE OF ONE RIB OF LEFT SIDE, INITIAL ENCOUNTER: Primary | ICD-10-CM

## 2024-12-28 PROCEDURE — 99283 EMERGENCY DEPT VISIT LOW MDM: CPT

## 2024-12-28 PROCEDURE — 99284 EMERGENCY DEPT VISIT MOD MDM: CPT

## 2024-12-28 PROCEDURE — 71101 X-RAY EXAM UNILAT RIBS/CHEST: CPT | Performed by: STUDENT IN AN ORGANIZED HEALTH CARE EDUCATION/TRAINING PROGRAM

## 2024-12-28 RX ORDER — HYDROCODONE BITARTRATE AND ACETAMINOPHEN 5; 325 MG/1; MG/1
1 TABLET ORAL ONCE
Status: COMPLETED | OUTPATIENT
Start: 2024-12-28 | End: 2024-12-28

## 2024-12-28 RX ORDER — HYDROCODONE BITARTRATE AND ACETAMINOPHEN 5; 325 MG/1; MG/1
1 TABLET ORAL EVERY 8 HOURS PRN
Qty: 10 TABLET | Refills: 0 | Status: SHIPPED | OUTPATIENT
Start: 2024-12-28 | End: 2025-01-02

## 2024-12-28 NOTE — ED INITIAL ASSESSMENT (HPI)
Pt to ED with pain to left flank radiating into abdomen. Pt reports mechanical fall on his cement porch 2 days ago, fell forward onto his knees and hands. Denies hitting his head, no LOC. +blood thinner. Denies CP/SOB

## 2024-12-28 NOTE — ED PROVIDER NOTES
History     Chief Complaint   Patient presents with    Fall       HPI    89 year old male presents with left lower lateral chest wall pain and tenderness.  Patient states 2 days ago he slipped on his driveway and fell landing on his hands and knees.  Did not strike his head or have LOC.  Since then he has been having pain along his chest wall with deep breathing or coughing or twisting.  Denies head, neck, back, abdominal or pelvic discomfort.  He is on Plavix.  He states he has cancer in his bones from prior renal cell, unsure where          Past Medical History:    Abdominal hernia    Anxiety state, unspecified    Arthritis    Back pain    Cancer (HCC)    kidney CA , BONES, PROSTATE    Chronic rhinitis    Constipation    CORONARY ARTERY DISEASE    Coronary atherosclerosis    Depression    Diarrhea, unspecified    Dizziness    Endocrine disorder    Esophageal reflux    Frequent use of laxatives    Frequent UTI    Glaucoma    Heart attack (HCC)    x5    Hemorrhoids    High blood pressure    High cholesterol    History of angioplasty    History of kidney cancer    Hypothyroid    Itch of skin    Kidney problem    missing right kidney    Loss of appetite    Nausea    Night sweats    Osteoporosis    Other and unspecified hyperlipidemia    RA (rheumatoid arthritis) (HCC)    Stented coronary artery    Stress    Uncomfortable fullness after meals    Unspecified disorder of thyroid    Unspecified essential hypertension    Unspecified sleep apnea    no CPAP    Visual impairment    Vitamin B 12 deficiency    Vomiting       Past Surgical History:   Procedure Laterality Date    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    Angiogram      Angioplasty (coronary)      4 stents to LAD    Appendectomy      Appendectomy      Cath percutaneous  transluminal coronary angioplasty      Cholecystectomy      Heart surgery      Laparoscopic cholecystectomy N/A 03/25/2015    Procedure: LAPAROSCOPIC CHOLECYSTECTOMY;  Surgeon: Preston Weber,  DO;  Location:  MAIN OR    Laparoscopy, surgical; nephrectomy      Right Kidney    Other surgical history Right 1996    nephrectomy    Other surgical history      vasectomy    Removal gallbladder      Surgical stent  , 2013    coronary    Tonsillectomy      Vasectomy  1971       Social History     Socioeconomic History    Marital status:    Occupational History    Occupation: retired [employer]    Tobacco Use    Smoking status: Former     Current packs/day: 0.00     Types: Cigarettes     Start date: 1976     Quit date: 1976     Years since quittin.0    Smokeless tobacco: Never    Tobacco comments:     quit 50 yrs ago   Vaping Use    Vaping status: Never Used   Substance and Sexual Activity    Alcohol use: No    Drug use: No   Other Topics Concern     Service Yes     Comment: [employer]. served in [place]    Social History Narrative    2 sons & adopted daughter     Social Drivers of Health     Food Insecurity: Low Risk  (2024)    Received from [facility] Food Security     Within the past 12 months, the food you bought just didn't last and you didn't have money to get more.: 3     Within the past 12 months, you worried that your food would run out before you got money to buy more.: 3   Transportation Needs: Not At Risk (2024)    Received from [facility] Transportation Needs     In the past 12 months, has lack of reliable transportation kept you from medical appointments, meetings, work or from getting things needed for daily living?: No   Housing Stability: Not At Risk (2024)    Received from [facility] Housing     What is your living situation today?: I have a steady place to live     Think about the place you live. Do you have problems with any of the following?: None of the above                   Physical Exam     ED Triage Vitals [24 0633]   BP (!) 139/109   Pulse 65   Resp 20   Temp 97.8 °F (36.6 °C)   Temp src  Oral   SpO2 95 %   O2 Device None (Room air)       Physical Exam  Constitutional:       General: He is not in acute distress.  HENT:      Head: Normocephalic and atraumatic.      Comments: No facial trauma noted     Nose: Nose normal.   Eyes:      Extraocular Movements: Extraocular movements intact.      Conjunctiva/sclera: Conjunctivae normal.      Pupils: Pupils are equal, round, and reactive to light.   Cardiovascular:      Rate and Rhythm: Normal rate and regular rhythm.   Pulmonary:      Effort: Pulmonary effort is normal.      Breath sounds: Normal breath sounds.   Chest:      Chest wall: Tenderness (Left anterolateral) present.   Abdominal:      General: There is no distension.      Palpations: Abdomen is soft.      Tenderness: There is no abdominal tenderness.   Musculoskeletal:      Cervical back: Normal range of motion. No tenderness.      Comments: No midline T/L ttp or deformities  Ranging BUE and BLE without difficulty  No pain with log rolling/axial loading hips   Skin:     General: Skin is warm and dry.   Neurological:      General: No focal deficit present.      Mental Status: He is alert.              ED Course     Labs Reviewed - No data to display  XR RIBS WITH CHEST (3 VIEWS), LEFT  (CPT=71101)    Result Date: 12/28/2024  CONCLUSION:        1. Frontal view chest shows normal heart size without sign of CHF.  Coronary stents are present.  No sign of pneumonia, pleural effusion, pneumothorax.  Tortuous ectatic aorta.  2. Three views of the left ribs are obtained.  On one view, there is cortical offset age indeterminate involving a distal left lower rib, probably the 9th rib.  Suggestion of some healing around this, and therefore this may not be acute.  Correlate with location of pain.  No other potential rib fractures seen.   LOCATION:  [facility]     Dictated by (CST): Yung Gonsalez MD on 12/28/2024 at 7:40 AM     Finalized by (CST): Yung Gonsalez MD on 12/28/2024 at 7:42 AM            MDM      Vitals:    12/28/24 0633 12/28/24 0826   BP: (!) 139/109 (!) 166/87   Pulse: 65 58   Resp: 20 18   Temp: 97.8 °F (36.6 °C)    TempSrc: Oral    SpO2: 95% 95%   Weight: 70.3 kg    Height: 167.6 cm (5' 6\")        Mechanical fall with chest wall strain, contusion, fracture on differential.  Moving air well.  Neurovascular intact distally in extremities.  No other signs of trauma.  Abdomen is benign.    ED Course as of 12/28/24 1345  ------------------------------------------------------------  Time: 12/28 0806  Comment: My interpretation of chest x-ray without pneumothorax.  Radiology is noting possible left 9th rib fracture.  ------------------------------------------------------------  Time: 12/28 0816  Comment: I discussed findings with patient at bedside.  Good response to Norco.  Will given incentive spirometer and we discussed healthy pulmonary toilet.  Supportive measures and return precautions.  He is able to ambulate, discharged home in stable condition         Disposition and Plan     Clinical Impression:  1. Closed fracture of one rib of left side, initial encounter        Disposition:  Discharge    Follow-up:  [facility] [address] [address] [phone]    Follow up        Medications Prescribed:  Discharge Medication List as of 12/28/2024  8:22 AM        START taking these medications    Details   !! HYDROcodone-acetaminophen 5-325 MG Oral Tab Take 1 tablet by mouth every 8 (eight) hours as needed for Pain., Normal, Disp-10 tablet, R-0       !! - Potential duplicate medications found. Please discuss with provider.

## 2025-01-17 ENCOUNTER — APPOINTMENT (OUTPATIENT)
Dept: GENERAL RADIOLOGY | Age: 89
End: 2025-01-17
Attending: EMERGENCY MEDICINE
Payer: MEDICARE

## 2025-01-17 ENCOUNTER — HOSPITAL ENCOUNTER (EMERGENCY)
Age: 89
Discharge: HOME | End: 2025-01-17
Attending: EMERGENCY MEDICINE
Payer: MEDICARE

## 2025-01-17 ENCOUNTER — APPOINTMENT (OUTPATIENT)
Dept: ULTRASOUND IMAGING | Age: 89
End: 2025-01-17
Payer: MEDICARE

## 2025-01-17 VITALS
HEART RATE: 61 BPM | WEIGHT: 155 LBS | SYSTOLIC BLOOD PRESSURE: 120 MMHG | HEIGHT: 66 IN | BODY MASS INDEX: 24.91 KG/M2 | DIASTOLIC BLOOD PRESSURE: 60 MMHG | OXYGEN SATURATION: 99 % | RESPIRATION RATE: 16 BRPM | TEMPERATURE: 98 F

## 2025-01-17 DIAGNOSIS — S80.01XA: ICD-10-CM

## 2025-01-17 DIAGNOSIS — S82.831A OTHER CLOSED FRACTURE OF PROXIMAL END OF RIGHT FIBULA, INITIAL ENCOUNTER: Primary | ICD-10-CM

## 2025-01-17 PROCEDURE — 73560 X-RAY EXAM OF KNEE 1 OR 2: CPT | Performed by: EMERGENCY MEDICINE

## 2025-01-17 PROCEDURE — 93971 EXTREMITY STUDY: CPT | Performed by: EMERGENCY MEDICINE

## 2025-01-17 PROCEDURE — 99284 EMERGENCY DEPT VISIT MOD MDM: CPT

## 2025-01-17 NOTE — ED INITIAL ASSESSMENT (HPI)
Reports he was at podiatrist and asked them about the pain he has had behind his right knee for past month.

## 2025-01-17 NOTE — ED PROVIDER NOTES
Patient Seen in: [facility] [place]      History     Chief Complaint   Patient presents with    Pain     Stated Complaint: pain behind right knee for 1 month    Subjective:   89-year-old male presents with right posterior knee pain.  Occurred about any trauma about a month ago.  Persistent.  Hurts when he squeezes it.  No calf pain.  No back pain.  No foot or ankle pain.  No hip pain.              Objective:     Past Medical History:    Abdominal hernia    Anxiety state, unspecified    Arthritis    Back pain    Cancer (HCC)    kidney CA , BONES, PROSTATE    Chronic rhinitis    Constipation    CORONARY ARTERY DISEASE    Coronary atherosclerosis    Depression    Diarrhea, unspecified    Dizziness    Endocrine disorder    Esophageal reflux    Frequent use of laxatives    Frequent UTI    Glaucoma    Heart attack (HCC)    x5    Hemorrhoids    High blood pressure    High cholesterol    History of angioplasty    History of kidney cancer    Hypothyroid    Itch of skin    Kidney problem    missing right kidney    Loss of appetite    Nausea    Night sweats    Osteoporosis    Other and unspecified hyperlipidemia    RA (rheumatoid arthritis) (HCC)    Stented coronary artery    Stress    Uncomfortable fullness after meals    Unspecified disorder of thyroid    Unspecified essential hypertension    Unspecified sleep apnea    no CPAP    Visual impairment    Vitamin B 12 deficiency    Vomiting              Past Surgical History:   Procedure Laterality Date    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    Angiogram      Angioplasty (coronary)      4 stents to LAD    Appendectomy      Appendectomy      Cath percutaneous  transluminal coronary angioplasty      Cholecystectomy      Heart surgery      Laparoscopic cholecystectomy N/A 03/25/2015    Procedure: LAPAROSCOPIC CHOLECYSTECTOMY;  Surgeon: Preston Weber DO;  Location:  MAIN OR    Laparoscopy, surgical; nephrectomy      Right Kidney    Other surgical  history Right 1996    nephrectomy    Other surgical history      vasectomy    Removal gallbladder      Surgical stent  , 2013    coronary    Tonsillectomy      Vasectomy  1971                Social History     Socioeconomic History    Marital status:    Occupational History    Occupation: retired [employer]    Tobacco Use    Smoking status: Former     Current packs/day: 0.00     Types: Cigarettes     Start date: 1976     Quit date: 1976     Years since quittin.1    Smokeless tobacco: Never    Tobacco comments:     quit 50 yrs ago   Vaping Use    Vaping status: Never Used   Substance and Sexual Activity    Alcohol use: No    Drug use: No   Other Topics Concern     Service Yes     Comment: [employer]. served in [place]    Social History Narrative    2 sons & adopted daughter     Social Drivers of Health     Food Insecurity: Low Risk  (2024)    Received from [facility] Food Security     Within the past 12 months, the food you bought just didn't last and you didn't have money to get more.: 3     Within the past 12 months, you worried that your food would run out before you got money to buy more.: 3   Transportation Needs: Not At Risk (2024)    Received from [facility] Transportation Needs     In the past 12 months, has lack of reliable transportation kept you from medical appointments, meetings, work or from getting things needed for daily living?: No   Housing Stability: Not At Risk (2024)    Received from [facility] Housing     What is your living situation today?: I have a steady place to live     Think about the place you live. Do you have problems with any of the following?: None of the above                  Physical Exam     ED Triage Vitals   BP 25 1235 98/56   Pulse 25 1233 70   Resp 25 1233 16   Temp 25 1233 98.1 °F (36.7 °C)   Temp src 25 1233 Temporal   SpO2 25 1233 97 %   O2  Device 01/17/25 1233 None (Room air)       Current Vitals:   Vital Signs  BP: 120/60  Pulse: 61  Resp: 16  Temp: 98.1 °F (36.7 °C)  Temp src: Temporal    Oxygen Therapy  SpO2: 99 %  O2 Device: None (Room air)        Physical Exam  Vitals and nursing note reviewed.   Constitutional:       Appearance: He is not toxic-appearing.   HENT:      Head: Normocephalic.   Cardiovascular:      Pulses: Normal pulses.   Pulmonary:      Effort: No respiratory distress.   Musculoskeletal:         General: Tenderness present. No deformity.      Cervical back: Neck supple.   Skin:     General: Skin is warm and dry.   Neurological:      Mental Status: He is alert.      Sensory: No sensory deficit.      Coordination: Coordination normal.   Psychiatric:         Mood and Affect: Mood normal.         Behavior: Behavior normal.       No pain with palpation of the anterior knee or calf or foot or ankle.  Distal pulses intact.  No hip pain.  No thigh pain.  No hamstring pain.  Had some fullness in the popliteal fossa with some tenderness to palpation.  Full range of motion.  No deformity.  No skin changes.  Neurovascular intact    ED Course   Labs Reviewed - No data to display                MDM      XR KNEE (1 OR 2 VIEWS), RIGHT (CPT=73560)    Result Date: 1/17/2025  CONCLUSION:  1. No acute fractures. 2. Suspicion for a healing versus healed fracture along the proximal fibular metaphysis.  Please correlate clinically. 3. Mild to moderate tricompartmental osteoarthritis with mild joint effusion.   LOCATION:  [facility]   Dictated by (CST): Shan Salazar DO on 1/17/2025 at 2:13 PM     Finalized by (CST): Shan Salazar DO on 1/17/2025 at 2:17 PM       US VENOUS DOPPLER LEG RIGHT - DIAG IMG (CPT=93971)    Result Date: 1/17/2025  CONCLUSION:   1. No evidence of DVT in the right lower extremity.  2. Minimal fluid with echogenic center in the medial aspect of the popliteal fossa may extend into the medial gastrocnemius head.  Possibility of this  representing a hematoma is of consideration.    LOCATION:  [id number]    Dictated by (CST): Stoney Crawford MD on 1/17/2025 at 2:14 PM     Finalized by (CST): Stoney Crawford MD on 1/17/2025 at 2:21 PM        External chart review demonstrates an outpatient physical therapy note from Jg Castro at bedside helpful to provide information on the history presenting illness    Differential diagnosis includes, but limited to, fracture, contusion, sprain, arthritis, muscle spasm, Baker's cyst    I independently interpreted x-ray of the right knee and note the healing right proximal fibular fracture    89-year-old male with posterior knee pain.  I do see that 20 days ago he was in the ER for a fall related to both knees.  I suspect that he had a proximal fibular fracture at that time.  This explain the subacute appearance of the x-ray.  This explain the hematoma the popliteal fossa as well.  He is neurovascular intact.  Given orthopedic follow-up.  He is walking and ambulatory.  Discharge home, shared decision making utilized and return precaution provided, patient in agreement    Patient was screened and evaluated during this visit.  As the treating physician attending to the patient, I determined within reasonable clinical confidence and prior to discharge, that an emergency medical condition was not or was no longer present.  There was no indication for further evaluation, treatment, or admission on an emergency basis.  Comprehensive verbal and written discharge and follow-up instructions were provided to help prevent relapse or worsening.  Patient was instructed to follow-up with their primary care provider for further evaluation and treatment, return immediately to ER for worsening, concerning, new, or changing/persisting symptoms. I discussed the case with the patient and they had no questions, complaints, or concerns.  Patient was comfortable going home.     Per the discharge paperwork, patients are  encouraged to and given instructions on how to sign up for MyChart, where they have access to their records, including any/all incidental findings.     This note was prepared using Dragon Medical voice recognition dictation software. As a result errors may occur. When identified these errors have been corrected. While every attempt is made to correct errors during dictation discrepancies may still exist    Note to patient: The 21st Century Cures Act makes medical notes like these available to patients in the interest of transparency. However, this is a medical document intended as peer to peer communication. It is written in medical language and may contain abbreviations or verbiage that are unfamiliar. It may appear blunt or direct. Medical documents are intended to carry relevant information, facts as evident, and the clinical opinion of the practitioner.           Medical Decision Making      Disposition and Plan     Clinical Impression:  1. Other closed fracture of proximal end of right fibula, initial encounter    2. Traumatic hematoma of right popliteal region, initial encounter         Disposition:  Discharge  1/17/2025  2:28 pm    Follow-up:  [facility]  [address]  [facility] [address] [phone]    Follow up      Thad Mena MD  [address]  [address]  [facility] [id number] [phone]    Follow up            Medications Prescribed:  Current Discharge Medication List              Supplementary Documentation:

## (undated) NOTE — ED AVS SNAPSHOT
Lynn Fiore   MRN: [id number]    Department:  [facility] in [place]   Date of Visit:  6/8/2018           Disclosure     Insurance plans vary and the physician(s) referred by the ER may not be covered by your plan.  Please contac tell this physician (or your personal doctor if your instructions are to return to your personal doctor) about any new or lasting problems. The primary care or specialist physician will see patients referred from the [facility] Emergency Department.  Saba Morocho

## (undated) NOTE — ED AVS SNAPSHOT
Anjelica Maldonado   MRN: [id number]    Department:  THE [facility] in [place]   Date of Visit:  11/5/2018           Disclosure     Insurance plans vary and the physician(s) referred by the ER may not be covered by your plan.  Please conta tell this physician (or your personal doctor if your instructions are to return to your personal doctor) about any new or lasting problems. The primary care or specialist physician will see patients referred from the [facility] Emergency Department.  Inessa Rosas

## (undated) NOTE — IP AVS SNAPSHOT
[facility] [place] [address] [place], [address] ~ [phone]                Discharge Summary   5/30/2017    Sandi Wilson           Admission Information        Provider Department    5/30/2017 Kevyn Barros MD  [id number] Next dose due: Tomorrow         Take 40 mg by mouth daily. Clopidogrel Bisulfate 75 MG Tabs   Last time this was given:  75 mg on 5/31/2017  8:20 AM   Commonly known as:  PLAVIX   Next dose due:   Tomorrow         Take 75 mg by m Next dose due: Tonight         Take 40 mg by mouth nightly. RABEprazole Sodium 20 MG Tbec   Commonly known as:  ACIPHEX   Next dose due: Tomorrow         Take 20 mg by mouth daily.                             tamsulosin HCl 0.4 WBC RBC Hemoglobin Hematocrit MCV MCH MCHC RDW Platelet MPV    (76/71/75)  8.5 (05/30/17)  4.36 (05/30/17)  14.7 (05/30/17)  41.8 (05/30/17)  95.9 -- -- -- (05/30/17)  149.0 (L) --      Recent Hematology Lab Results (cont.)  (Last 3 results in the past 90 Medicaid plans. To get signed up and covered, please call [phone] and ask to get set up for an insurance coverage that is in-network with [facility].         [employer]     Call the [employer] for assistance with your inactive [employer] account Cholesterol Lowering Medications     Pravastatin Sodium (PRAVACHOL) 40 MG Oral Tab    ezetimibe (ZETIA) 10 MG Oral Tab       Use: Lower cholesterol, protect your heart   Most common side effects: Dizziness, constipation, abnormal liver function   What to r Anti-Histamines     Levocetirizine Dihydrochloride (XYZAL) 5 MG Oral Tab         Use:  Treat Allergies   Most common side effects:  Drowsiness, dry mouth   What to report to your healthcare team: Changes in thinking, confusion, palpitations           [employer]

## (undated) NOTE — ED AVS SNAPSHOT
Antonina Eugene   MRN: [id number]    Department:  THE [facility] in [place]   Date of Visit:  9/4/2017           Disclosure     Insurance plans vary and the physician(s) referred by the ER may not be covered by your plan.  Please contac If you have been prescribed any medication(s), please fill your prescription right away and begin taking the medication(s) as directed    If the emergency physician has read X-rays, these will be re-interpreted by a radiologist.  If there is a significant

## (undated) NOTE — MR AVS SNAPSHOT
[address]  [address] [facility] [address] [phone]               Thank you for choosing us for your health care visit with LOUISA Gonzalez.   We are glad to serve you and happy to provide you with this summary of ezetimibe 10 MG Tabs   Take 10 mg by mouth nightly. Commonly known as:  ZETIA           FINASTERIDE OR   Take 1 tablet by mouth daily. 1 tab daily           GLUCOSAMINE CHONDROITIN COMPLX OR   Take 1,500 mg by mouth daily.            Levothyroxine Call the [employer] for assistance with your inactive [employer] account    If you have questions, you can call [phone] to talk to our [facility] Staff. Remember, [employer] is NOT to be used for urgent needs. For medical emergencies, dial 911.     Vi